# Patient Record
Sex: FEMALE | Race: WHITE | NOT HISPANIC OR LATINO | URBAN - METROPOLITAN AREA
[De-identification: names, ages, dates, MRNs, and addresses within clinical notes are randomized per-mention and may not be internally consistent; named-entity substitution may affect disease eponyms.]

---

## 2021-08-10 ENCOUNTER — INPATIENT (INPATIENT)
Facility: HOSPITAL | Age: 87
LOS: 6 days | Discharge: HOME | DRG: 690 | End: 2021-08-17
Attending: FAMILY MEDICINE | Admitting: HOSPITALIST
Payer: MEDICARE

## 2021-08-10 VITALS — HEIGHT: 60 IN | WEIGHT: 104.94 LBS

## 2021-08-10 DIAGNOSIS — R53.1 WEAKNESS: ICD-10-CM

## 2021-08-10 LAB
ALBUMIN SERPL ELPH-MCNC: 3.6 G/DL — SIGNIFICANT CHANGE UP (ref 3.3–5)
ALP SERPL-CCNC: 67 U/L — SIGNIFICANT CHANGE UP (ref 40–120)
ALT FLD-CCNC: 23 U/L — SIGNIFICANT CHANGE UP (ref 12–78)
ANION GAP SERPL CALC-SCNC: 4 MMOL/L — LOW (ref 5–17)
AST SERPL-CCNC: 25 U/L — SIGNIFICANT CHANGE UP (ref 15–37)
BASOPHILS # BLD AUTO: 0.07 K/UL — SIGNIFICANT CHANGE UP (ref 0–0.2)
BASOPHILS NFR BLD AUTO: 0.4 % — SIGNIFICANT CHANGE UP (ref 0–2)
BILIRUB SERPL-MCNC: 0.4 MG/DL — SIGNIFICANT CHANGE UP (ref 0.2–1.2)
BUN SERPL-MCNC: 24 MG/DL — HIGH (ref 7–23)
CALCIUM SERPL-MCNC: 9.5 MG/DL — SIGNIFICANT CHANGE UP (ref 8.5–10.1)
CHLORIDE SERPL-SCNC: 102 MMOL/L — SIGNIFICANT CHANGE UP (ref 96–108)
CO2 SERPL-SCNC: 29 MMOL/L — SIGNIFICANT CHANGE UP (ref 22–31)
CREAT SERPL-MCNC: 0.65 MG/DL — SIGNIFICANT CHANGE UP (ref 0.5–1.3)
EOSINOPHIL # BLD AUTO: 0.09 K/UL — SIGNIFICANT CHANGE UP (ref 0–0.5)
EOSINOPHIL NFR BLD AUTO: 0.5 % — SIGNIFICANT CHANGE UP (ref 0–6)
GLUCOSE SERPL-MCNC: 277 MG/DL — HIGH (ref 70–99)
HCT VFR BLD CALC: 32.5 % — LOW (ref 34.5–45)
HGB BLD-MCNC: 10.8 G/DL — LOW (ref 11.5–15.5)
IMM GRANULOCYTES NFR BLD AUTO: 0.6 % — SIGNIFICANT CHANGE UP (ref 0–1.5)
LYMPHOCYTES # BLD AUTO: 1.48 K/UL — SIGNIFICANT CHANGE UP (ref 1–3.3)
LYMPHOCYTES # BLD AUTO: 8.5 % — LOW (ref 13–44)
MAGNESIUM SERPL-MCNC: 2.3 MG/DL — SIGNIFICANT CHANGE UP (ref 1.6–2.6)
MCHC RBC-ENTMCNC: 29.4 PG — SIGNIFICANT CHANGE UP (ref 27–34)
MCHC RBC-ENTMCNC: 33.2 GM/DL — SIGNIFICANT CHANGE UP (ref 32–36)
MCV RBC AUTO: 88.6 FL — SIGNIFICANT CHANGE UP (ref 80–100)
MONOCYTES # BLD AUTO: 1.36 K/UL — HIGH (ref 0–0.9)
MONOCYTES NFR BLD AUTO: 7.8 % — SIGNIFICANT CHANGE UP (ref 2–14)
NEUTROPHILS # BLD AUTO: 14.37 K/UL — HIGH (ref 1.8–7.4)
NEUTROPHILS NFR BLD AUTO: 82.2 % — HIGH (ref 43–77)
PHOSPHATE SERPL-MCNC: 1.9 MG/DL — LOW (ref 2.5–4.5)
PLATELET # BLD AUTO: 303 K/UL — SIGNIFICANT CHANGE UP (ref 150–400)
POTASSIUM SERPL-MCNC: 4 MMOL/L — SIGNIFICANT CHANGE UP (ref 3.5–5.3)
POTASSIUM SERPL-SCNC: 4 MMOL/L — SIGNIFICANT CHANGE UP (ref 3.5–5.3)
PROT SERPL-MCNC: 7.7 GM/DL — SIGNIFICANT CHANGE UP (ref 6–8.3)
RBC # BLD: 3.67 M/UL — LOW (ref 3.8–5.2)
RBC # FLD: 13.2 % — SIGNIFICANT CHANGE UP (ref 10.3–14.5)
SARS-COV-2 RNA SPEC QL NAA+PROBE: SIGNIFICANT CHANGE UP
SODIUM SERPL-SCNC: 135 MMOL/L — SIGNIFICANT CHANGE UP (ref 135–145)
WBC # BLD: 17.47 K/UL — HIGH (ref 3.8–10.5)
WBC # FLD AUTO: 17.47 K/UL — HIGH (ref 3.8–10.5)

## 2021-08-10 PROCEDURE — 70450 CT HEAD/BRAIN W/O DYE: CPT | Mod: 26,MA

## 2021-08-10 PROCEDURE — 99285 EMERGENCY DEPT VISIT HI MDM: CPT

## 2021-08-10 PROCEDURE — 85027 COMPLETE CBC AUTOMATED: CPT

## 2021-08-10 PROCEDURE — 97530 THERAPEUTIC ACTIVITIES: CPT | Mod: GP

## 2021-08-10 PROCEDURE — 85025 COMPLETE CBC W/AUTO DIFF WBC: CPT

## 2021-08-10 PROCEDURE — 82962 GLUCOSE BLOOD TEST: CPT

## 2021-08-10 PROCEDURE — 36415 COLL VENOUS BLD VENIPUNCTURE: CPT

## 2021-08-10 PROCEDURE — 97161 PT EVAL LOW COMPLEX 20 MIN: CPT | Mod: GP

## 2021-08-10 PROCEDURE — 80048 BASIC METABOLIC PNL TOTAL CA: CPT

## 2021-08-10 PROCEDURE — 99222 1ST HOSP IP/OBS MODERATE 55: CPT

## 2021-08-10 PROCEDURE — 71045 X-RAY EXAM CHEST 1 VIEW: CPT | Mod: 26

## 2021-08-10 PROCEDURE — 87040 BLOOD CULTURE FOR BACTERIA: CPT

## 2021-08-10 PROCEDURE — 93010 ELECTROCARDIOGRAM REPORT: CPT

## 2021-08-10 PROCEDURE — 83036 HEMOGLOBIN GLYCOSYLATED A1C: CPT

## 2021-08-10 PROCEDURE — 86769 SARS-COV-2 COVID-19 ANTIBODY: CPT

## 2021-08-10 PROCEDURE — 87086 URINE CULTURE/COLONY COUNT: CPT

## 2021-08-10 PROCEDURE — 80053 COMPREHEN METABOLIC PANEL: CPT

## 2021-08-10 PROCEDURE — 85610 PROTHROMBIN TIME: CPT

## 2021-08-10 PROCEDURE — 97116 GAIT TRAINING THERAPY: CPT | Mod: GP

## 2021-08-10 RX ORDER — SODIUM CHLORIDE 9 MG/ML
500 INJECTION INTRAMUSCULAR; INTRAVENOUS; SUBCUTANEOUS ONCE
Refills: 0 | Status: COMPLETED | OUTPATIENT
Start: 2021-08-10 | End: 2021-08-10

## 2021-08-10 RX ORDER — METFORMIN HYDROCHLORIDE 850 MG/1
1 TABLET ORAL
Qty: 0 | Refills: 0 | DISCHARGE

## 2021-08-10 RX ORDER — CEFTRIAXONE 500 MG/1
1000 INJECTION, POWDER, FOR SOLUTION INTRAMUSCULAR; INTRAVENOUS EVERY 24 HOURS
Refills: 0 | Status: DISCONTINUED | OUTPATIENT
Start: 2021-08-11 | End: 2021-08-13

## 2021-08-10 RX ORDER — DEXTROSE 50 % IN WATER 50 %
25 SYRINGE (ML) INTRAVENOUS ONCE
Refills: 0 | Status: DISCONTINUED | OUTPATIENT
Start: 2021-08-10 | End: 2021-08-17

## 2021-08-10 RX ORDER — CEFTRIAXONE 500 MG/1
1000 INJECTION, POWDER, FOR SOLUTION INTRAMUSCULAR; INTRAVENOUS EVERY 24 HOURS
Refills: 0 | Status: DISCONTINUED | OUTPATIENT
Start: 2021-08-10 | End: 2021-08-10

## 2021-08-10 RX ORDER — LOVASTATIN 20 MG
1 TABLET ORAL
Qty: 0 | Refills: 0 | DISCHARGE

## 2021-08-10 RX ORDER — HEPARIN SODIUM 5000 [USP'U]/ML
5000 INJECTION INTRAVENOUS; SUBCUTANEOUS EVERY 12 HOURS
Refills: 0 | Status: DISCONTINUED | OUTPATIENT
Start: 2021-08-10 | End: 2021-08-12

## 2021-08-10 RX ORDER — ASPIRIN/CALCIUM CARB/MAGNESIUM 324 MG
325 TABLET ORAL DAILY
Refills: 0 | Status: DISCONTINUED | OUTPATIENT
Start: 2021-08-10 | End: 2021-08-14

## 2021-08-10 RX ORDER — GLUCAGON INJECTION, SOLUTION 0.5 MG/.1ML
1 INJECTION, SOLUTION SUBCUTANEOUS ONCE
Refills: 0 | Status: DISCONTINUED | OUTPATIENT
Start: 2021-08-10 | End: 2021-08-17

## 2021-08-10 RX ORDER — AMLODIPINE BESYLATE 2.5 MG/1
1 TABLET ORAL
Qty: 0 | Refills: 0 | DISCHARGE

## 2021-08-10 RX ORDER — LEVOTHYROXINE SODIUM 125 MCG
1 TABLET ORAL
Qty: 0 | Refills: 0 | DISCHARGE

## 2021-08-10 RX ORDER — METOPROLOL TARTRATE 50 MG
1 TABLET ORAL
Qty: 0 | Refills: 0 | DISCHARGE

## 2021-08-10 RX ORDER — ONDANSETRON 8 MG/1
4 TABLET, FILM COATED ORAL EVERY 8 HOURS
Refills: 0 | Status: DISCONTINUED | OUTPATIENT
Start: 2021-08-10 | End: 2021-08-17

## 2021-08-10 RX ORDER — METOPROLOL TARTRATE 50 MG
50 TABLET ORAL DAILY
Refills: 0 | Status: DISCONTINUED | OUTPATIENT
Start: 2021-08-10 | End: 2021-08-17

## 2021-08-10 RX ORDER — DEXTROSE 50 % IN WATER 50 %
12.5 SYRINGE (ML) INTRAVENOUS ONCE
Refills: 0 | Status: DISCONTINUED | OUTPATIENT
Start: 2021-08-10 | End: 2021-08-17

## 2021-08-10 RX ORDER — SODIUM CHLORIDE 9 MG/ML
1000 INJECTION, SOLUTION INTRAVENOUS
Refills: 0 | Status: DISCONTINUED | OUTPATIENT
Start: 2021-08-10 | End: 2021-08-17

## 2021-08-10 RX ORDER — ACETAMINOPHEN 500 MG
650 TABLET ORAL EVERY 6 HOURS
Refills: 0 | Status: DISCONTINUED | OUTPATIENT
Start: 2021-08-10 | End: 2021-08-17

## 2021-08-10 RX ORDER — DEXTROSE 50 % IN WATER 50 %
15 SYRINGE (ML) INTRAVENOUS ONCE
Refills: 0 | Status: DISCONTINUED | OUTPATIENT
Start: 2021-08-10 | End: 2021-08-17

## 2021-08-10 RX ORDER — CHOLECALCIFEROL (VITAMIN D3) 125 MCG
1000 CAPSULE ORAL DAILY
Refills: 0 | Status: DISCONTINUED | OUTPATIENT
Start: 2021-08-10 | End: 2021-08-17

## 2021-08-10 RX ORDER — CHOLECALCIFEROL (VITAMIN D3) 125 MCG
1 CAPSULE ORAL
Qty: 0 | Refills: 0 | DISCHARGE

## 2021-08-10 RX ORDER — ATORVASTATIN CALCIUM 80 MG/1
10 TABLET, FILM COATED ORAL AT BEDTIME
Refills: 0 | Status: DISCONTINUED | OUTPATIENT
Start: 2021-08-10 | End: 2021-08-17

## 2021-08-10 RX ORDER — AMLODIPINE BESYLATE 2.5 MG/1
10 TABLET ORAL DAILY
Refills: 0 | Status: DISCONTINUED | OUTPATIENT
Start: 2021-08-10 | End: 2021-08-17

## 2021-08-10 RX ORDER — LEVOTHYROXINE SODIUM 125 MCG
25 TABLET ORAL DAILY
Refills: 0 | Status: DISCONTINUED | OUTPATIENT
Start: 2021-08-10 | End: 2021-08-17

## 2021-08-10 RX ORDER — INSULIN LISPRO 100/ML
VIAL (ML) SUBCUTANEOUS
Refills: 0 | Status: DISCONTINUED | OUTPATIENT
Start: 2021-08-10 | End: 2021-08-17

## 2021-08-10 RX ORDER — LANOLIN ALCOHOL/MO/W.PET/CERES
3 CREAM (GRAM) TOPICAL AT BEDTIME
Refills: 0 | Status: DISCONTINUED | OUTPATIENT
Start: 2021-08-10 | End: 2021-08-17

## 2021-08-10 RX ADMIN — SODIUM CHLORIDE 1000 MILLILITER(S): 9 INJECTION INTRAMUSCULAR; INTRAVENOUS; SUBCUTANEOUS at 15:38

## 2021-08-10 NOTE — ED PROVIDER NOTE - ENMT, MLM
Airway patent, Nasal mucosa clear. Mouth with normal mucosa. Throat has no vesicles, no oropharyngeal exudates and uvula is midline. Ears: Pt is hard of hearing

## 2021-08-10 NOTE — H&P ADULT - HISTORY OF PRESENT ILLNESS
89 year old female patient who lives alone and is fully independent presented to the ED with her son who reported that she had sudden onset generalized weakness after a fall while on the toilet. Patient ambulates without the need for assistive devices but was unable to get up due to generalized upper extremity weakness. Patient and son denied any head trauma, loc, neck pain, upper or lower extremity pain.        In the ED patient found to have a Ua consistent with a UTI. CT head noted for No acute intracranial hemorrhage, vasogenic edema, extra-axial collection or midline shift. Mild chronic microvascular changes and age-related involutional change

## 2021-08-10 NOTE — ED STATDOCS - NS_ ATTENDINGSCRIBEDETAILS _ED_A_ED_FT
I, Jennifer Greer MD, performed the initial face to face bedside interview with this patient regarding history of present illness and determined that the patient should be seen in the main ED.  The history, was documented by the scribe in my presence and I attest to the accuracy of the documentation.

## 2021-08-10 NOTE — H&P ADULT - NSHPPHYSICALEXAM_GEN_ALL_CORE
Vital Signs Last 24 Hrs  T(C): 36.6 (10 Aug 2021 13:41), Max: 36.6 (10 Aug 2021 13:41)  T(F): 97.9 (10 Aug 2021 13:41), Max: 97.9 (10 Aug 2021 13:41)  HR: 84 (10 Aug 2021 13:41) (84 - 84)  BP: 137/61 (10 Aug 2021 13:41) (137/61 - 137/61)  BP(mean): 84 (10 Aug 2021 13:41) (84 - 84)  RR: 18 (10 Aug 2021 13:41) (18 - 18)  SpO2: 100% (10 Aug 2021 13:41) (100% - 100%)

## 2021-08-10 NOTE — ED PROVIDER NOTE - OBJECTIVE STATEMENT
89 y/o F with a PMHx of HTN, DM, thyroid disease and HLD presents to the ED c/o sudden onset of N/V and weakness yesterday while on the toilet. Pt fell off the toilet and crawled on floor and called her son. The son came and picked up the pt and fed the pt. The pt reports her symptoms improved but she went back to the bathroom later and fell again. Denies CP, HA, SOB. No LOC. The son came to bring the pt to the couch to allow the pt to sleep. Son states that the pt called him today saying that he fell against. No head injury, No LOC. Son helped pt up against and brought her in. Pt lives at home alone.

## 2021-08-10 NOTE — ED PROVIDER NOTE - EYES, MLM
Clear bilaterally, pupils equal, round and reactive to light. L lid droop, which son states is the pt's baseline

## 2021-08-10 NOTE — ED STATDOCS - PROGRESS NOTE DETAILS
Yaquelin Greer: 89 y/o female presents to the ED c/o sudden onset of increase weakness starting yesterday. Son states pt fell off the toilet yesterday and was unable to get up on her own. Pt was on the floor for 15 minutes until son came to help her. No hx of episode before. Pt states she vomited yesterday. Pt denies pain, CP, SOB. Will send pt to main ED for further evaluation.

## 2021-08-10 NOTE — ED PROVIDER NOTE - CLINICAL SUMMARY MEDICAL DECISION MAKING FREE TEXT BOX
pt with hx of HTN, DM here with generalized weakness and 3 falls without any injury. Plan: CT, head, UA, CXR, labs.

## 2021-08-10 NOTE — H&P ADULT - ASSESSMENT
89 year old female patient with weakness and debility found to have a UTI        -Admit to [facility]        # Weakness/ Debility  -UTI likely contributing  -will get PT evaluation  -may need [facility] or home PT    # UTI  -ua consistent with UTI  -on rocephin  -f/u urine cx    #DM2  -hold oral hypoglycemics  -ISS    # HLD  -on statin    # Advanced Directives  -Full code    # Other  -son Michael Chau can be reached at [phone]  # DVT ppx  -heparin sq

## 2021-08-10 NOTE — ED PROVIDER NOTE - CARE PLAN
Principal Discharge DX:	General weakness   1 Principal Discharge DX:	General weakness  Secondary Diagnosis:	Acute UTI

## 2021-08-10 NOTE — ED PROVIDER NOTE - NSICDXPASTMEDICALHX_GEN_ALL_CORE_FT
PAST MEDICAL HISTORY:  DM (diabetes mellitus)     HLD (hyperlipidemia)     HTN (hypertension)     Thyroid disease

## 2021-08-10 NOTE — ED ADULT TRIAGE NOTE - CHIEF COMPLAINT QUOTE
Pt c/o weakness that started yesterday.  Her provider recommended that she be seen in the ED.  Pt was unable to get up from toilet d/t weakness and lowered herself to the ground and had to crawl to living room to get help.  Denies trauma or head injury.  + anticoagulants

## 2021-08-11 LAB
A1C WITH ESTIMATED AVERAGE GLUCOSE RESULT: 7.3 % — HIGH (ref 4–5.6)
ALBUMIN SERPL ELPH-MCNC: 3.1 G/DL — LOW (ref 3.3–5)
ALP SERPL-CCNC: 64 U/L — SIGNIFICANT CHANGE UP (ref 40–120)
ALT FLD-CCNC: 20 U/L — SIGNIFICANT CHANGE UP (ref 12–78)
ANION GAP SERPL CALC-SCNC: 4 MMOL/L — LOW (ref 5–17)
AST SERPL-CCNC: 21 U/L — SIGNIFICANT CHANGE UP (ref 15–37)
BASOPHILS # BLD AUTO: 0.08 K/UL — SIGNIFICANT CHANGE UP (ref 0–0.2)
BASOPHILS NFR BLD AUTO: 0.6 % — SIGNIFICANT CHANGE UP (ref 0–2)
BILIRUB SERPL-MCNC: 0.3 MG/DL — SIGNIFICANT CHANGE UP (ref 0.2–1.2)
BUN SERPL-MCNC: 18 MG/DL — SIGNIFICANT CHANGE UP (ref 7–23)
CALCIUM SERPL-MCNC: 8.7 MG/DL — SIGNIFICANT CHANGE UP (ref 8.5–10.1)
CHLORIDE SERPL-SCNC: 106 MMOL/L — SIGNIFICANT CHANGE UP (ref 96–108)
CO2 SERPL-SCNC: 29 MMOL/L — SIGNIFICANT CHANGE UP (ref 22–31)
COVID-19 SPIKE DOMAIN AB INTERP: POSITIVE
COVID-19 SPIKE DOMAIN ANTIBODY RESULT: >250 U/ML — HIGH
CREAT SERPL-MCNC: 0.38 MG/DL — LOW (ref 0.5–1.3)
EOSINOPHIL # BLD AUTO: 0.42 K/UL — SIGNIFICANT CHANGE UP (ref 0–0.5)
EOSINOPHIL NFR BLD AUTO: 3.3 % — SIGNIFICANT CHANGE UP (ref 0–6)
ESTIMATED AVERAGE GLUCOSE: 163 MG/DL — HIGH (ref 68–114)
GLUCOSE SERPL-MCNC: 155 MG/DL — HIGH (ref 70–99)
HCT VFR BLD CALC: 33 % — LOW (ref 34.5–45)
HGB BLD-MCNC: 10.8 G/DL — LOW (ref 11.5–15.5)
IMM GRANULOCYTES NFR BLD AUTO: 0.5 % — SIGNIFICANT CHANGE UP (ref 0–1.5)
INR BLD: 1.1 RATIO — SIGNIFICANT CHANGE UP (ref 0.88–1.16)
LYMPHOCYTES # BLD AUTO: 1.95 K/UL — SIGNIFICANT CHANGE UP (ref 1–3.3)
LYMPHOCYTES # BLD AUTO: 15.4 % — SIGNIFICANT CHANGE UP (ref 13–44)
MCHC RBC-ENTMCNC: 29.2 PG — SIGNIFICANT CHANGE UP (ref 27–34)
MCHC RBC-ENTMCNC: 32.7 GM/DL — SIGNIFICANT CHANGE UP (ref 32–36)
MCV RBC AUTO: 89.2 FL — SIGNIFICANT CHANGE UP (ref 80–100)
MONOCYTES # BLD AUTO: 1.04 K/UL — HIGH (ref 0–0.9)
MONOCYTES NFR BLD AUTO: 8.2 % — SIGNIFICANT CHANGE UP (ref 2–14)
NEUTROPHILS # BLD AUTO: 9.13 K/UL — HIGH (ref 1.8–7.4)
NEUTROPHILS NFR BLD AUTO: 72 % — SIGNIFICANT CHANGE UP (ref 43–77)
PLATELET # BLD AUTO: 314 K/UL — SIGNIFICANT CHANGE UP (ref 150–400)
POTASSIUM SERPL-MCNC: 3.4 MMOL/L — LOW (ref 3.5–5.3)
POTASSIUM SERPL-SCNC: 3.4 MMOL/L — LOW (ref 3.5–5.3)
PROT SERPL-MCNC: 6.9 GM/DL — SIGNIFICANT CHANGE UP (ref 6–8.3)
PROTHROM AB SERPL-ACNC: 12.7 SEC — SIGNIFICANT CHANGE UP (ref 10.6–13.6)
RBC # BLD: 3.7 M/UL — LOW (ref 3.8–5.2)
RBC # FLD: 13.2 % — SIGNIFICANT CHANGE UP (ref 10.3–14.5)
SARS-COV-2 IGG+IGM SERPL QL IA: >250 U/ML — HIGH
SARS-COV-2 IGG+IGM SERPL QL IA: POSITIVE
SODIUM SERPL-SCNC: 139 MMOL/L — SIGNIFICANT CHANGE UP (ref 135–145)
WBC # BLD: 12.68 K/UL — HIGH (ref 3.8–10.5)
WBC # FLD AUTO: 12.68 K/UL — HIGH (ref 3.8–10.5)

## 2021-08-11 PROCEDURE — 99232 SBSQ HOSP IP/OBS MODERATE 35: CPT

## 2021-08-11 RX ADMIN — Medication 50 MILLIGRAM(S): at 10:13

## 2021-08-11 RX ADMIN — Medication 25 MICROGRAM(S): at 06:18

## 2021-08-11 RX ADMIN — Medication 1 TABLET(S): at 10:13

## 2021-08-11 RX ADMIN — HEPARIN SODIUM 5000 UNIT(S): 5000 INJECTION INTRAVENOUS; SUBCUTANEOUS at 10:13

## 2021-08-11 RX ADMIN — Medication 1000 UNIT(S): at 10:13

## 2021-08-11 RX ADMIN — AMLODIPINE BESYLATE 10 MILLIGRAM(S): 2.5 TABLET ORAL at 10:13

## 2021-08-11 RX ADMIN — ATORVASTATIN CALCIUM 10 MILLIGRAM(S): 80 TABLET, FILM COATED ORAL at 21:43

## 2021-08-11 RX ADMIN — ATORVASTATIN CALCIUM 10 MILLIGRAM(S): 80 TABLET, FILM COATED ORAL at 00:05

## 2021-08-11 RX ADMIN — CEFTRIAXONE 1000 MILLIGRAM(S): 500 INJECTION, POWDER, FOR SOLUTION INTRAMUSCULAR; INTRAVENOUS at 01:41

## 2021-08-11 RX ADMIN — HEPARIN SODIUM 5000 UNIT(S): 5000 INJECTION INTRAVENOUS; SUBCUTANEOUS at 21:43

## 2021-08-11 RX ADMIN — Medication 1: at 18:01

## 2021-08-11 RX ADMIN — Medication 5: at 12:04

## 2021-08-11 RX ADMIN — Medication 325 MILLIGRAM(S): at 11:19

## 2021-08-11 NOTE — PHYSICAL THERAPY INITIAL EVALUATION ADULT - PERTINENT HX OF CURRENT PROBLEM, REHAB EVAL
presented to the ED with her son who reported that she had sudden onset generalized weakness after a fall while on the toilet, UA c/w UTI, CTH neg ac findings

## 2021-08-11 NOTE — PHYSICAL THERAPY INITIAL EVALUATION ADULT - GENERAL OBSERVATIONS, REHAB EVAL
pt rec'd supine in bed on 5E, agreeable to PT, son present, pt [place]. L eyelid droops-chr. per pt

## 2021-08-12 LAB
ANION GAP SERPL CALC-SCNC: 4 MMOL/L — LOW (ref 5–17)
BUN SERPL-MCNC: 16 MG/DL — SIGNIFICANT CHANGE UP (ref 7–23)
CALCIUM SERPL-MCNC: 8.7 MG/DL — SIGNIFICANT CHANGE UP (ref 8.5–10.1)
CHLORIDE SERPL-SCNC: 107 MMOL/L — SIGNIFICANT CHANGE UP (ref 96–108)
CO2 SERPL-SCNC: 29 MMOL/L — SIGNIFICANT CHANGE UP (ref 22–31)
CREAT SERPL-MCNC: 0.42 MG/DL — LOW (ref 0.5–1.3)
CULTURE RESULTS: SIGNIFICANT CHANGE UP
GLUCOSE SERPL-MCNC: 128 MG/DL — HIGH (ref 70–99)
HCT VFR BLD CALC: 31.8 % — LOW (ref 34.5–45)
HGB BLD-MCNC: 10.2 G/DL — LOW (ref 11.5–15.5)
MCHC RBC-ENTMCNC: 28.9 PG — SIGNIFICANT CHANGE UP (ref 27–34)
MCHC RBC-ENTMCNC: 32.1 GM/DL — SIGNIFICANT CHANGE UP (ref 32–36)
MCV RBC AUTO: 90.1 FL — SIGNIFICANT CHANGE UP (ref 80–100)
PLATELET # BLD AUTO: 288 K/UL — SIGNIFICANT CHANGE UP (ref 150–400)
POTASSIUM SERPL-MCNC: 3.4 MMOL/L — LOW (ref 3.5–5.3)
POTASSIUM SERPL-SCNC: 3.4 MMOL/L — LOW (ref 3.5–5.3)
RBC # BLD: 3.53 M/UL — LOW (ref 3.8–5.2)
RBC # FLD: 13.2 % — SIGNIFICANT CHANGE UP (ref 10.3–14.5)
SODIUM SERPL-SCNC: 140 MMOL/L — SIGNIFICANT CHANGE UP (ref 135–145)
SPECIMEN SOURCE: SIGNIFICANT CHANGE UP
WBC # BLD: 10.62 K/UL — HIGH (ref 3.8–10.5)
WBC # FLD AUTO: 10.62 K/UL — HIGH (ref 3.8–10.5)

## 2021-08-12 PROCEDURE — 99232 SBSQ HOSP IP/OBS MODERATE 35: CPT

## 2021-08-12 RX ORDER — POTASSIUM CHLORIDE 20 MEQ
40 PACKET (EA) ORAL ONCE
Refills: 0 | Status: COMPLETED | OUTPATIENT
Start: 2021-08-12 | End: 2021-08-12

## 2021-08-12 RX ADMIN — AMLODIPINE BESYLATE 10 MILLIGRAM(S): 2.5 TABLET ORAL at 10:53

## 2021-08-12 RX ADMIN — Medication 1: at 12:32

## 2021-08-12 RX ADMIN — Medication 1000 UNIT(S): at 10:54

## 2021-08-12 RX ADMIN — Medication 40 MILLIEQUIVALENT(S): at 10:53

## 2021-08-12 RX ADMIN — ATORVASTATIN CALCIUM 10 MILLIGRAM(S): 80 TABLET, FILM COATED ORAL at 21:44

## 2021-08-12 RX ADMIN — Medication 50 MILLIGRAM(S): at 10:53

## 2021-08-12 RX ADMIN — Medication 25 MICROGRAM(S): at 05:16

## 2021-08-12 RX ADMIN — HEPARIN SODIUM 5000 UNIT(S): 5000 INJECTION INTRAVENOUS; SUBCUTANEOUS at 10:54

## 2021-08-12 RX ADMIN — CEFTRIAXONE 1000 MILLIGRAM(S): 500 INJECTION, POWDER, FOR SOLUTION INTRAMUSCULAR; INTRAVENOUS at 00:33

## 2021-08-12 RX ADMIN — Medication 1 TABLET(S): at 10:54

## 2021-08-12 RX ADMIN — Medication 1: at 08:29

## 2021-08-12 RX ADMIN — Medication 325 MILLIGRAM(S): at 10:53

## 2021-08-12 NOTE — CONSULT NOTE ADULT - SUBJECTIVE AND OBJECTIVE BOX
89 yo F with a hx of hemorrhoids presents with bright red blood per rectum x 2 days. Patient states that she tried to use the bathroom and bloody came out of her rectum. She states today the blood was darker. She has admits to a hx of constipation but none now. She is passing gas. Her last colonoscopy which was more than 20 years ago she states shows the hemorrhoids but no other pathologies. She denies abdominal pain, diarrhea, nausea, vomiting, fever or chills    ROS neg except as above     PMH: DM, HLD. HTN, Thyroid disease.   Allergies: None  Meds: as per chart  PSH: Denies  Sohx: no tobacco, etoh, or illicit drugs    Vitals:  T(C): 36.4 (08-12 @ 08:00), Max: 37.1 (08-12 @ 00:13)  HR: 92 (08-12 @ 08:00) (80 - 92)  BP: 134/60 (08-12 @ 08:00) (113/53 - 134/60)  RR: 19 (08-12 @ 08:00) (18 - 19)  SpO2: 94% (08-12 @ 08:00) (92% - 97%)    08-11 @ 07:01  -  08-12 @ 07:00  --------------------------------------------------------  IN:  Total IN: 0 mL    OUT:    Voided (mL): 2 mL  Total OUT: 2 mL    Total NET: -2 mL      Physical Exam:  General: AAOx3, Well developed, NAD  Chest: Normal respiratory effort  Heart: RRR  Abdomen: Soft, distended, non tender  Rectal: 3 prolapsed hemorrhoids noted, blood noted, liquid stool, no masses appreciated  Neuro/Psych: No localized deficits. Normal speech, normal tone  Skin: Normal, no rashes, no lesions noted.   Extremities: Warm, well perfused, no edema, Pulses intact    08-12 @ 07:10                    10.2  CBC: 10.62>)-------(<288                     31.8                 140 | 107 | 16    CMP:  ----------------------< 128               3.4 | 29 | 0.42                      Ca:8.7  Phos:-  Mg:-               -|      |-        LFTs:  ------|-|-----             -|      |-  08-11 @ 06:50                    10.8  CBC: 12.68>)-------(<314                     33.0                 139 | 106 | 18    CMP:  ----------------------< 155               3.4 | 29 | 0.38                      Ca:8.7  Phos:-  Mg:-               0.3|      |21        LFTs:  ------|64|-----             -|      |-      Culture - Blood (collected 08-10-21 @ 21:16)  Source: .Blood None  Preliminary Report (08-12-21 @ 05:01):    No growth to date.    Culture - Blood (collected 08-10-21 @ 21:15)  Source: .Blood None  Preliminary Report (08-12-21 @ 05:01):    No growth to date.      Current Inpatient Medications:  acetaminophen   Tablet .. 650 milliGRAM(s) Oral every 6 hours PRN  aluminum hydroxide/magnesium hydroxide/simethicone Suspension 30 milliLiter(s) Oral every 4 hours PRN  amLODIPine   Tablet 10 milliGRAM(s) Oral daily  aspirin enteric coated 325 milliGRAM(s) Oral daily  atorvastatin 10 milliGRAM(s) Oral at bedtime  cefTRIAXone Injectable. 1000 milliGRAM(s) IV Push every 24 hours  cholecalciferol 1000 Unit(s) Oral daily  dextrose 40% Gel 15 Gram(s) Oral once  dextrose 5%. 1000 milliLiter(s) (50 mL/Hr) IV Continuous <Continuous>  dextrose 5%. 1000 milliLiter(s) (100 mL/Hr) IV Continuous <Continuous>  dextrose 50% Injectable 25 Gram(s) IV Push once  dextrose 50% Injectable 12.5 Gram(s) IV Push once  dextrose 50% Injectable 25 Gram(s) IV Push once  glucagon  Injectable 1 milliGRAM(s) IntraMuscular once  heparin   Injectable 5000 Unit(s) SubCutaneous every 12 hours  insulin lispro (ADMELOG) corrective regimen sliding scale   SubCutaneous three times a day before meals  levothyroxine 25 MICROGram(s) Oral daily  melatonin 3 milliGRAM(s) Oral at bedtime PRN  metoprolol succinate ER 50 milliGRAM(s) Oral daily  multivitamin 1 Tablet(s) Oral daily  ondansetron Injectable 4 milliGRAM(s) IV Push every 8 hours PRN

## 2021-08-12 NOTE — CONSULT NOTE ADULT - ASSESSMENT
89 yo F presents with bleeding internal hemorrhoids, grade 4.    Plan:  No surgical intervention at this time due to elderly status. Conservative management at this time.   Recommend high fiber diet, oral hydration  Sitz baths  Monitor HH, transfuse PRN  Reconsult if needed. Thanks for the consult.  Rest of management as per primary team    Plan discussed with Dr. Adams.  89 yo F presents with external hemorrhoids, grade 4, not inflamed at this time. Bleeding likely from internal hemorrhoids    Plan:  No surgical intervention at this time due to elderly status. Conservative management at this time.   Recommend high fiber diet, oral hydration  Sitz baths  Monitor HH, transfuse PRN  Reconsult if needed. Thanks for the consult.  Rest of management as per primary team  Can have patient follow up as outpatient after discharge    Plan discussed with Dr. Adams.

## 2021-08-13 PROCEDURE — 99232 SBSQ HOSP IP/OBS MODERATE 35: CPT

## 2021-08-13 RX ORDER — SENNA PLUS 8.6 MG/1
1 TABLET ORAL DAILY
Refills: 0 | Status: DISCONTINUED | OUTPATIENT
Start: 2021-08-13 | End: 2021-08-17

## 2021-08-13 RX ORDER — POLYETHYLENE GLYCOL 3350 17 G/17G
17 POWDER, FOR SOLUTION ORAL DAILY
Refills: 0 | Status: DISCONTINUED | OUTPATIENT
Start: 2021-08-13 | End: 2021-08-17

## 2021-08-13 RX ADMIN — SENNA PLUS 1 TABLET(S): 8.6 TABLET ORAL at 10:36

## 2021-08-13 RX ADMIN — Medication 25 MICROGRAM(S): at 05:35

## 2021-08-13 RX ADMIN — Medication 1: at 08:24

## 2021-08-13 RX ADMIN — CEFTRIAXONE 1000 MILLIGRAM(S): 500 INJECTION, POWDER, FOR SOLUTION INTRAMUSCULAR; INTRAVENOUS at 01:23

## 2021-08-13 RX ADMIN — Medication 50 MILLIGRAM(S): at 09:27

## 2021-08-13 RX ADMIN — Medication 1 TABLET(S): at 09:27

## 2021-08-13 RX ADMIN — Medication 1000 UNIT(S): at 09:27

## 2021-08-13 RX ADMIN — Medication 2: at 11:57

## 2021-08-13 RX ADMIN — Medication 325 MILLIGRAM(S): at 09:28

## 2021-08-13 RX ADMIN — Medication 1: at 16:53

## 2021-08-13 RX ADMIN — POLYETHYLENE GLYCOL 3350 17 GRAM(S): 17 POWDER, FOR SOLUTION ORAL at 10:36

## 2021-08-13 RX ADMIN — AMLODIPINE BESYLATE 10 MILLIGRAM(S): 2.5 TABLET ORAL at 09:27

## 2021-08-13 NOTE — PHARMACOTHERAPY INTERVENTION NOTE - COMMENTS
Patient on IV to PO ASP List. Urine cx negative and pt reports no sx. Ceftriaxone D/C'd after 3 doses as it is adequate duration of therapy.

## 2021-08-14 LAB
ANION GAP SERPL CALC-SCNC: 3 MMOL/L — LOW (ref 5–17)
BUN SERPL-MCNC: 16 MG/DL — SIGNIFICANT CHANGE UP (ref 7–23)
CALCIUM SERPL-MCNC: 8.9 MG/DL — SIGNIFICANT CHANGE UP (ref 8.5–10.1)
CHLORIDE SERPL-SCNC: 103 MMOL/L — SIGNIFICANT CHANGE UP (ref 96–108)
CO2 SERPL-SCNC: 30 MMOL/L — SIGNIFICANT CHANGE UP (ref 22–31)
CREAT SERPL-MCNC: 0.59 MG/DL — SIGNIFICANT CHANGE UP (ref 0.5–1.3)
GLUCOSE SERPL-MCNC: 222 MG/DL — HIGH (ref 70–99)
HCT VFR BLD CALC: 31 % — LOW (ref 34.5–45)
HGB BLD-MCNC: 10.3 G/DL — LOW (ref 11.5–15.5)
MCHC RBC-ENTMCNC: 29.5 PG — SIGNIFICANT CHANGE UP (ref 27–34)
MCHC RBC-ENTMCNC: 33.2 GM/DL — SIGNIFICANT CHANGE UP (ref 32–36)
MCV RBC AUTO: 88.8 FL — SIGNIFICANT CHANGE UP (ref 80–100)
PLATELET # BLD AUTO: 340 K/UL — SIGNIFICANT CHANGE UP (ref 150–400)
POTASSIUM SERPL-MCNC: 4.2 MMOL/L — SIGNIFICANT CHANGE UP (ref 3.5–5.3)
POTASSIUM SERPL-SCNC: 4.2 MMOL/L — SIGNIFICANT CHANGE UP (ref 3.5–5.3)
RBC # BLD: 3.49 M/UL — LOW (ref 3.8–5.2)
RBC # FLD: 13.1 % — SIGNIFICANT CHANGE UP (ref 10.3–14.5)
SODIUM SERPL-SCNC: 136 MMOL/L — SIGNIFICANT CHANGE UP (ref 135–145)
WBC # BLD: 10.55 K/UL — HIGH (ref 3.8–10.5)
WBC # FLD AUTO: 10.55 K/UL — HIGH (ref 3.8–10.5)

## 2021-08-14 PROCEDURE — 99232 SBSQ HOSP IP/OBS MODERATE 35: CPT

## 2021-08-14 RX ADMIN — POLYETHYLENE GLYCOL 3350 17 GRAM(S): 17 POWDER, FOR SOLUTION ORAL at 10:37

## 2021-08-14 RX ADMIN — AMLODIPINE BESYLATE 10 MILLIGRAM(S): 2.5 TABLET ORAL at 10:34

## 2021-08-14 RX ADMIN — Medication 2: at 11:55

## 2021-08-14 RX ADMIN — Medication 50 MILLIGRAM(S): at 10:34

## 2021-08-14 RX ADMIN — Medication 3 MILLIGRAM(S): at 21:19

## 2021-08-14 RX ADMIN — Medication 1 TABLET(S): at 10:34

## 2021-08-14 RX ADMIN — Medication 25 MICROGRAM(S): at 05:52

## 2021-08-14 RX ADMIN — Medication 1000 UNIT(S): at 10:34

## 2021-08-14 RX ADMIN — SENNA PLUS 1 TABLET(S): 8.6 TABLET ORAL at 21:17

## 2021-08-14 RX ADMIN — ATORVASTATIN CALCIUM 10 MILLIGRAM(S): 80 TABLET, FILM COATED ORAL at 21:17

## 2021-08-14 RX ADMIN — Medication 2: at 17:38

## 2021-08-14 RX ADMIN — Medication 1: at 08:26

## 2021-08-14 RX ADMIN — ATORVASTATIN CALCIUM 10 MILLIGRAM(S): 80 TABLET, FILM COATED ORAL at 05:50

## 2021-08-15 PROCEDURE — 99232 SBSQ HOSP IP/OBS MODERATE 35: CPT

## 2021-08-15 RX ORDER — PHENYLEPHRINE-SHARK LIVER OIL-MINERAL OIL-PETROLATUM RECTAL OINTMENT
1 OINTMENT (GRAM) RECTAL
Refills: 0 | Status: DISCONTINUED | OUTPATIENT
Start: 2021-08-15 | End: 2021-08-17

## 2021-08-15 RX ADMIN — Medication 1: at 16:47

## 2021-08-15 RX ADMIN — SENNA PLUS 1 TABLET(S): 8.6 TABLET ORAL at 21:46

## 2021-08-15 RX ADMIN — Medication 1 TABLET(S): at 11:07

## 2021-08-15 RX ADMIN — Medication 1000 UNIT(S): at 11:07

## 2021-08-15 RX ADMIN — Medication 2: at 08:03

## 2021-08-15 RX ADMIN — ATORVASTATIN CALCIUM 10 MILLIGRAM(S): 80 TABLET, FILM COATED ORAL at 21:45

## 2021-08-15 RX ADMIN — Medication 50 MILLIGRAM(S): at 11:07

## 2021-08-15 RX ADMIN — AMLODIPINE BESYLATE 10 MILLIGRAM(S): 2.5 TABLET ORAL at 11:07

## 2021-08-15 RX ADMIN — Medication 3: at 12:08

## 2021-08-15 RX ADMIN — Medication 3 MILLIGRAM(S): at 21:45

## 2021-08-15 RX ADMIN — POLYETHYLENE GLYCOL 3350 17 GRAM(S): 17 POWDER, FOR SOLUTION ORAL at 11:06

## 2021-08-15 RX ADMIN — Medication 25 MICROGRAM(S): at 05:34

## 2021-08-15 RX ADMIN — PHENYLEPHRINE-SHARK LIVER OIL-MINERAL OIL-PETROLATUM RECTAL OINTMENT 1 APPLICATION(S): at 21:45

## 2021-08-16 LAB
CULTURE RESULTS: SIGNIFICANT CHANGE UP
CULTURE RESULTS: SIGNIFICANT CHANGE UP
SPECIMEN SOURCE: SIGNIFICANT CHANGE UP
SPECIMEN SOURCE: SIGNIFICANT CHANGE UP

## 2021-08-16 PROCEDURE — 99239 HOSP IP/OBS DSCHRG MGMT >30: CPT

## 2021-08-16 RX ORDER — ASPIRIN/CALCIUM CARB/MAGNESIUM 324 MG
1 TABLET ORAL
Qty: 0 | Refills: 0 | DISCHARGE

## 2021-08-16 RX ORDER — CX-024414 0.2 MG/ML
0.5 INJECTION, SUSPENSION INTRAMUSCULAR
Qty: 0 | Refills: 0 | DISCHARGE

## 2021-08-16 RX ORDER — SENNA PLUS 8.6 MG/1
1 TABLET ORAL
Qty: 0 | Refills: 0 | DISCHARGE
Start: 2021-08-16

## 2021-08-16 RX ADMIN — Medication 4: at 12:28

## 2021-08-16 RX ADMIN — Medication 3 MILLIGRAM(S): at 22:00

## 2021-08-16 RX ADMIN — Medication 2: at 16:36

## 2021-08-16 RX ADMIN — AMLODIPINE BESYLATE 10 MILLIGRAM(S): 2.5 TABLET ORAL at 09:20

## 2021-08-16 RX ADMIN — Medication 25 MICROGRAM(S): at 05:27

## 2021-08-16 RX ADMIN — PHENYLEPHRINE-SHARK LIVER OIL-MINERAL OIL-PETROLATUM RECTAL OINTMENT 1 APPLICATION(S): at 12:11

## 2021-08-16 RX ADMIN — Medication 50 MILLIGRAM(S): at 09:20

## 2021-08-16 RX ADMIN — Medication 650 MILLIGRAM(S): at 05:28

## 2021-08-16 RX ADMIN — Medication 1 TABLET(S): at 09:20

## 2021-08-16 RX ADMIN — POLYETHYLENE GLYCOL 3350 17 GRAM(S): 17 POWDER, FOR SOLUTION ORAL at 09:20

## 2021-08-16 RX ADMIN — ATORVASTATIN CALCIUM 10 MILLIGRAM(S): 80 TABLET, FILM COATED ORAL at 22:00

## 2021-08-16 RX ADMIN — PHENYLEPHRINE-SHARK LIVER OIL-MINERAL OIL-PETROLATUM RECTAL OINTMENT 1 APPLICATION(S): at 22:00

## 2021-08-16 RX ADMIN — Medication 1000 UNIT(S): at 09:20

## 2021-08-16 RX ADMIN — SENNA PLUS 1 TABLET(S): 8.6 TABLET ORAL at 22:00

## 2021-08-16 NOTE — DISCHARGE NOTE PROVIDER - NSDCMRMEDTOKEN_GEN_ALL_CORE_FT
amLODIPine 10 mg oral tablet: 1 tab(s) orally once a day  glipiZIDE 10 mg oral tablet: 1 tab(s) orally 2 times a day  levothyroxine 25 mcg (0.025 mg) oral tablet: 1 tab(s) orally once a day  lovastatin 10 mg oral tablet: 1 tab(s) orally once a day  metFORMIN 500 mg oral tablet: 1 tab(s) orally 2 times a day  metoprolol succinate 50 mg oral tablet, extended release: 1 tab(s) orally once a day  Multiple Vitamins oral tablet: 1 tab(s) orally once a day  senna oral tablet: 1 tab(s) orally once a day  Vitamin D3 25 mcg (1000 intl units) oral tablet: 1 tab(s) orally once a day   amLODIPine 10 mg oral tablet: 1 tab(s) orally once a day  levothyroxine 25 mcg (0.025 mg) oral tablet: 1 tab(s) orally once a day  lovastatin 10 mg oral tablet: 1 tab(s) orally once a day  metFORMIN 500 mg oral tablet: 1 tab(s) orally 2 times a day  metoprolol succinate 50 mg oral tablet, extended release: 1 tab(s) orally once a day  Multiple Vitamins oral tablet: 1 tab(s) orally once a day  senna oral tablet: 1 tab(s) orally once a day  Vitamin D3 25 mcg (1000 intl units) oral tablet: 1 tab(s) orally once a day

## 2021-08-16 NOTE — PROVIDER CONTACT NOTE (OTHER) - SITUATION
notified Dr Joshi's office of admission please fax over d.c paperwork to [phone] once pt is d.c
 is aware of consult.

## 2021-08-16 NOTE — DISCHARGE NOTE PROVIDER - NSDCCPCAREPLAN_GEN_ALL_CORE_FT
PRINCIPAL DISCHARGE DIAGNOSIS  Diagnosis: General weakness  Assessment and Plan of Treatment: PHysical therapy and rehab      SECONDARY DISCHARGE DIAGNOSES  Diagnosis: Acute UTI  Assessment and Plan of Treatment: RESOLVED    Diagnosis: Hemorrhoids  Assessment and Plan of Treatment: With mild bleeding at times - blood counts stable.  continue supportive care.  bowel regimen  sitz bath  fiber  prep H

## 2021-08-17 VITALS
DIASTOLIC BLOOD PRESSURE: 54 MMHG | TEMPERATURE: 97 F | OXYGEN SATURATION: 94 % | SYSTOLIC BLOOD PRESSURE: 124 MMHG | HEART RATE: 89 BPM | RESPIRATION RATE: 18 BRPM

## 2021-08-17 PROCEDURE — 99232 SBSQ HOSP IP/OBS MODERATE 35: CPT

## 2021-08-17 RX ADMIN — AMLODIPINE BESYLATE 10 MILLIGRAM(S): 2.5 TABLET ORAL at 08:27

## 2021-08-17 RX ADMIN — Medication 1 TABLET(S): at 08:28

## 2021-08-17 RX ADMIN — Medication 25 MICROGRAM(S): at 06:08

## 2021-08-17 RX ADMIN — PHENYLEPHRINE-SHARK LIVER OIL-MINERAL OIL-PETROLATUM RECTAL OINTMENT 1 APPLICATION(S): at 08:30

## 2021-08-17 RX ADMIN — Medication 2: at 08:18

## 2021-08-17 RX ADMIN — Medication 50 MILLIGRAM(S): at 08:28

## 2021-08-17 RX ADMIN — Medication 1000 UNIT(S): at 08:28

## 2021-08-17 RX ADMIN — Medication 3: at 12:00

## 2021-08-17 NOTE — PROGRESS NOTE ADULT - SUBJECTIVE AND OBJECTIVE BOX
CC: Weakness  History of Present Illness:   89 year old female patient who lives alone and is fully independent presented to the ED with her son who reported that she had sudden onset generalized weakness after a fall while on the toilet. Patient ambulates without the need for assistive devices but was unable to get up due to generalized upper extremity weakness. Patient and son denied any head trauma, loc, neck pain, upper or lower extremity pain.  In the ED patient found to have a Ua consistent with a UTI. CT head noted for No acute intracranial hemorrhage, vasogenic edema, extra-axial collection or midline shift. Mild chronic microvascular changes and age-related involutional change.    : Awake, lying in bed, feeling well.  Discussed plan of care.  Pt Denies fever, chills, N, V, abd pain, CP, SOB.    REVIEW OF SYSTEMS: All other review of systems is negative unless indicated above.    Vital Signs Last 24 Hrs  T(C): 36.8 (11 Aug 2021 08:45), Max: 37 (11 Aug 2021 02:34)  T(F): 98.3 (11 Aug 2021 08:45), Max: 98.6 (11 Aug 2021 02:34)  HR: 72 (11 Aug 2021 08:45) (72 - 100)  BP: 119/56 (11 Aug 2021 08:45) (119/56 - 137/61)  BP(mean): 88 (11 Aug 2021 07:51) (84 - 100)  RR: 18 (11 Aug 2021 08:45) (17 - 18)  SpO2: 97% (11 Aug 2021 08:45) (96% - 100%)    PHYSICAL EXAM:    Constitutional: NAD, awake and alert, frail, thin appearing  HEENT: PERR, EOMI, Normal Hearing, MMM  Neck: Soft and supple  Respiratory: Breath sounds are clear bilaterally, No wheezing, rales or rhonchi  Cardiovascular: S1 and S2, regular rate and rhythm, no Murmurs, gallops or rubs  Gastrointestinal: Bowel Sounds present, soft, nontender, nondistended, no guarding, no rebound  Extremities: No peripheral edema  Neurological: A/O x 3, no focal deficits in my limited exam            MEDICATIONS  (STANDING):  amLODIPine   Tablet 10 milliGRAM(s) Oral daily  aspirin enteric coated 325 milliGRAM(s) Oral daily  atorvastatin 10 milliGRAM(s) Oral at bedtime  cefTRIAXone Injectable. 1000 milliGRAM(s) IV Push every 24 hours  cholecalciferol 1000 Unit(s) Oral daily  dextrose 40% Gel 15 Gram(s) Oral once  dextrose 5%. 1000 milliLiter(s) (50 mL/Hr) IV Continuous <Continuous>  dextrose 5%. 1000 milliLiter(s) (100 mL/Hr) IV Continuous <Continuous>  dextrose 50% Injectable 25 Gram(s) IV Push once  dextrose 50% Injectable 12.5 Gram(s) IV Push once  dextrose 50% Injectable 25 Gram(s) IV Push once  glucagon  Injectable 1 milliGRAM(s) IntraMuscular once  heparin   Injectable 5000 Unit(s) SubCutaneous every 12 hours  insulin lispro (ADMELOG) corrective regimen sliding scale   SubCutaneous three times a day before meals  levothyroxine 25 MICROGram(s) Oral daily  metoprolol succinate ER 50 milliGRAM(s) Oral daily  multivitamin 1 Tablet(s) Oral daily    MEDICATIONS  (PRN):  acetaminophen   Tablet .. 650 milliGRAM(s) Oral every 6 hours PRN Temp greater or equal to 38.5C (101.3F), Mild Pain (1 - 3)  aluminum hydroxide/magnesium hydroxide/simethicone Suspension 30 milliLiter(s) Oral every 4 hours PRN Dyspepsia  melatonin 3 milliGRAM(s) Oral at bedtime PRN Insomnia  ondansetron Injectable 4 milliGRAM(s) IV Push every 8 hours PRN Nausea and/or Vomiting                                10.8   12.68 )-----------( 314      ( 11 Aug 2021 06:50 )             33.0     08-11    139  |  106  |  18  ----------------------------<  155<H>  3.4<L>   |  29  |  0.38<L>    Ca    8.7      11 Aug 2021 06:50  Phos  1.9     08-10  Mg     2.3     08-10    TPro  6.9  /  Alb  3.1<L>  /  TBili  0.3  /  DBili  x   /  AST  21  /  ALT  20  /  AlkPhos  64  08-11    CAPILLARY BLOOD GLUCOSE      POCT Blood Glucose.: 370 mg/dL (11 Aug 2021 11:50)  POCT Blood Glucose.: 382 mg/dL (11 Aug 2021 11:49)  POCT Blood Glucose.: 150 mg/dL (11 Aug 2021 07:28)    LIVER FUNCTIONS - ( 11 Aug 2021 06:50 )  Alb: 3.1 g/dL / Pro: 6.9 gm/dL / ALK PHOS: 64 U/L / ALT: 20 U/L / AST: 21 U/L / GGT: x           PT/INR - ( 11 Aug 2021 06:50 )   PT: 12.7 sec;   INR: 1.10 ratio           Urinalysis Basic - ( 10 Aug 2021 18:04 )    Color: Yellow / Appearance: Slightly Turbid / S.010 / pH: x  Gluc: x / Ketone: Trace  / Bili: Negative / Urobili: Negative mg/dL   Blood: x / Protein: 30 mg/dL / Nitrite: Negative   Leuk Esterase: Trace / RBC: 0-2 /HPF / WBC 3-5   Sq Epi: x / Non Sq Epi: Few / Bacteria: Occasional          Assessment and Plan:  89 year old female patient with weakness and debility found to have a UTI    # Weakness/ Debility/UTI:  -CT head no acute pathology  -c/w ceftriaxone  -f/u cultures  -PT eval      #DM2  -hold oral hypoglycemics  -a1c 7.3  -ISS    # HLD  -on statin    # Advanced Directives  -Full code    # Other  -son Michael Chau can be reached at [phone]  # DVT ppx  -heparin sq    
CC: Weakness  History of Present Illness:   89 year old female patient who lives alone and is fully independent presented to the ED with her son who reported that she had sudden onset generalized weakness after a fall while on the toilet. Patient ambulates without the need for assistive devices but was unable to get up due to generalized upper extremity weakness. Patient and son denied any head trauma, loc, neck pain, upper or lower extremity pain.  In the ED patient found to have a Ua consistent with a UTI. CT head noted for No acute intracranial hemorrhage, vasogenic edema, extra-axial collection or midline shift. Mild chronic microvascular changes and age-related involutional change.    8/11: Awake, lying in bed, feeling well.  Discussed plan of care.  Pt Denies fever, chills, N, V, abd pain, CP, SOB.  8/12: Had episode of rectal bleeding today.  Not feeling well, weak.  No fever, abd pain, chills, n, v.  8/13: Lying in bed, weak but otherwise doing okay.  Denies fever, chills, N, V, abd pain, CP, SOB.    REVIEW OF SYSTEMS: All other review of systems is negative unless indicated above.    Vital Signs Last 24 Hrs  T(C): 37.2 (13 Aug 2021 08:13), Max: 37.2 (13 Aug 2021 08:13)  T(F): 98.9 (13 Aug 2021 08:13), Max: 98.9 (13 Aug 2021 08:13)  HR: 101 (13 Aug 2021 08:13) (81 - 101)  BP: 125/63 (13 Aug 2021 08:13) (125/63 - 131/59)  BP(mean): --  RR: 18 (13 Aug 2021 08:13) (18 - 18)  SpO2: 97% (13 Aug 2021 08:13) (95% - 98%)    PHYSICAL EXAM:    Constitutional: NAD, awake and alert, frail, thin appearing  HEENT: PERR, EOMI, Normal Hearing, MMM  Neck: Soft and supple  Respiratory: Breath sounds are clear bilaterally, No wheezing, rales or rhonchi  Cardiovascular: S1 and S2, regular rate and rhythm, no Murmurs, gallops or rubs  Gastrointestinal: Bowel Sounds present, soft, nontender, nondistended, no guarding, no rebound  Extremities: No peripheral edema  Neurological: A/O x 3, no focal deficits in my limited exam    MEDICATIONS  (STANDING):  amLODIPine   Tablet 10 milliGRAM(s) Oral daily  aspirin enteric coated 325 milliGRAM(s) Oral daily  atorvastatin 10 milliGRAM(s) Oral at bedtime  cholecalciferol 1000 Unit(s) Oral daily  dextrose 40% Gel 15 Gram(s) Oral once  dextrose 5%. 1000 milliLiter(s) (50 mL/Hr) IV Continuous <Continuous>  dextrose 5%. 1000 milliLiter(s) (100 mL/Hr) IV Continuous <Continuous>  dextrose 50% Injectable 25 Gram(s) IV Push once  dextrose 50% Injectable 12.5 Gram(s) IV Push once  dextrose 50% Injectable 25 Gram(s) IV Push once  glucagon  Injectable 1 milliGRAM(s) IntraMuscular once  insulin lispro (ADMELOG) corrective regimen sliding scale   SubCutaneous three times a day before meals  levothyroxine 25 MICROGram(s) Oral daily  metoprolol succinate ER 50 milliGRAM(s) Oral daily  multivitamin 1 Tablet(s) Oral daily  polyethylene glycol 3350 17 Gram(s) Oral daily  senna 1 Tablet(s) Oral daily    MEDICATIONS  (PRN):  acetaminophen   Tablet .. 650 milliGRAM(s) Oral every 6 hours PRN Temp greater or equal to 38.5C (101.3F), Mild Pain (1 - 3)  aluminum hydroxide/magnesium hydroxide/simethicone Suspension 30 milliLiter(s) Oral every 4 hours PRN Dyspepsia  melatonin 3 milliGRAM(s) Oral at bedtime PRN Insomnia  ondansetron Injectable 4 milliGRAM(s) IV Push every 8 hours PRN Nausea and/or Vomiting                                10.2   10.62 )-----------( 288      ( 12 Aug 2021 07:10 )             31.8     08-12    140  |  107  |  16  ----------------------------<  128<H>  3.4<L>   |  29  |  0.42<L>    Ca    8.7      12 Aug 2021 07:10      CAPILLARY BLOOD GLUCOSE      POCT Blood Glucose.: 209 mg/dL (13 Aug 2021 11:44)  POCT Blood Glucose.: 194 mg/dL (13 Aug 2021 07:43)  POCT Blood Glucose.: 180 mg/dL (12 Aug 2021 21:41)  POCT Blood Glucose.: 121 mg/dL (12 Aug 2021 17:12)            Assessment and Plan:  89 year old female patient with weakness and debility found to have a UTI    # Weakness/ Debility/UTI:  -CT head no acute pathology  -c/w ceftriaxone day # 3 - stop further   -BCx no growth x 2  -PT     Bleeding internal hemorrhoids / constipation:   -evaluated by colorectal suggest conservative measures  -stop hep subc  -sitz bath, increase fiber  -bowel regimen   -H+H stable     #DM2  -hold oral hypoglycemics  -a1c 7.3  -ISS    # HLD  -on statin    # Advanced Directives  -Full code      # DVT ppx  -stop heparin sq due to bleeding hemorrhoids     Dispo:  Discharge to [facility] Monday.       
CC: Weakness  History of Present Illness:   89 year old female patient who lives alone and is fully independent presented to the ED with her son who reported that she had sudden onset generalized weakness after a fall while on the toilet. Patient ambulates without the need for assistive devices but was unable to get up due to generalized upper extremity weakness. Patient and son denied any head trauma, loc, neck pain, upper or lower extremity pain.  In the ED patient found to have a Ua consistent with a UTI. CT head noted for No acute intracranial hemorrhage, vasogenic edema, extra-axial collection or midline shift. Mild chronic microvascular changes and age-related involutional change.    : Awake, lying in bed, feeling well.  Discussed plan of care.  Pt Denies fever, chills, N, V, abd pain, CP, SOB.  : Had episode of rectal bleeding today.  Not feeling well, weak.  No fever, abd pain, chills, n, v.    REVIEW OF SYSTEMS: All other review of systems is negative unless indicated above.    Vital Signs Last 24 Hrs  T(C): 36.4 (12 Aug 2021 08:00), Max: 37.1 (12 Aug 2021 00:13)  T(F): 97.5 (12 Aug 2021 08:00), Max: 98.8 (12 Aug 2021 00:13)  HR: 92 (12 Aug 2021 08:00) (80 - 92)  BP: 134/60 (12 Aug 2021 08:00) (113/53 - 134/60)  BP(mean): --  RR: 19 (12 Aug 2021 08:00) (18 - 19)  SpO2: 94% (12 Aug 2021 08:00) (92% - 97%)    PHYSICAL EXAM:    Constitutional: NAD, awake and alert, frail, thin appearing  HEENT: PERR, EOMI, Normal Hearing, MMM  Neck: Soft and supple  Respiratory: Breath sounds are clear bilaterally, No wheezing, rales or rhonchi  Cardiovascular: S1 and S2, regular rate and rhythm, no Murmurs, gallops or rubs  Gastrointestinal: Bowel Sounds present, soft, nontender, nondistended, no guarding, no rebound  Extremities: No peripheral edema  Neurological: A/O x 3, no focal deficits in my limited exam    MEDICATIONS  (STANDING):  amLODIPine   Tablet 10 milliGRAM(s) Oral daily  aspirin enteric coated 325 milliGRAM(s) Oral daily  atorvastatin 10 milliGRAM(s) Oral at bedtime  cefTRIAXone Injectable. 1000 milliGRAM(s) IV Push every 24 hours  cholecalciferol 1000 Unit(s) Oral daily  dextrose 40% Gel 15 Gram(s) Oral once  dextrose 5%. 1000 milliLiter(s) (50 mL/Hr) IV Continuous <Continuous>  dextrose 5%. 1000 milliLiter(s) (100 mL/Hr) IV Continuous <Continuous>  dextrose 50% Injectable 25 Gram(s) IV Push once  dextrose 50% Injectable 12.5 Gram(s) IV Push once  dextrose 50% Injectable 25 Gram(s) IV Push once  glucagon  Injectable 1 milliGRAM(s) IntraMuscular once  insulin lispro (ADMELOG) corrective regimen sliding scale   SubCutaneous three times a day before meals  levothyroxine 25 MICROGram(s) Oral daily  metoprolol succinate ER 50 milliGRAM(s) Oral daily  multivitamin 1 Tablet(s) Oral daily    MEDICATIONS  (PRN):  acetaminophen   Tablet .. 650 milliGRAM(s) Oral every 6 hours PRN Temp greater or equal to 38.5C (101.3F), Mild Pain (1 - 3)  aluminum hydroxide/magnesium hydroxide/simethicone Suspension 30 milliLiter(s) Oral every 4 hours PRN Dyspepsia  melatonin 3 milliGRAM(s) Oral at bedtime PRN Insomnia  ondansetron Injectable 4 milliGRAM(s) IV Push every 8 hours PRN Nausea and/or Vomiting                              10.2   10.62 )-----------( 288      ( 12 Aug 2021 07:10 )             31.8     08-12    140  |  107  |  16  ----------------------------<  128<H>  3.4<L>   |  29  |  0.42<L>    Ca    8.7      12 Aug 2021 07:10  Phos  1.9     08-10  Mg     2.3     08-10    TPro  6.9  /  Alb  3.1<L>  /  TBili  0.3  /  DBili  x   /  AST  21  /  ALT  20  /  AlkPhos  64  08-11    CAPILLARY BLOOD GLUCOSE      POCT Blood Glucose.: 175 mg/dL (12 Aug 2021 12:16)  POCT Blood Glucose.: 185 mg/dL (12 Aug 2021 08:29)  POCT Blood Glucose.: 100 mg/dL (11 Aug 2021 21:19)  POCT Blood Glucose.: 162 mg/dL (11 Aug 2021 17:44)    LIVER FUNCTIONS - ( 11 Aug 2021 06:50 )  Alb: 3.1 g/dL / Pro: 6.9 gm/dL / ALK PHOS: 64 U/L / ALT: 20 U/L / AST: 21 U/L / GGT: x           PT/INR - ( 11 Aug 2021 06:50 )   PT: 12.7 sec;   INR: 1.10 ratio           Urinalysis Basic - ( 10 Aug 2021 18:04 )    Color: Yellow / Appearance: Slightly Turbid / S.010 / pH: x  Gluc: x / Ketone: Trace  / Bili: Negative / Urobili: Negative mg/dL   Blood: x / Protein: 30 mg/dL / Nitrite: Negative   Leuk Esterase: Trace / RBC: 0-2 /HPF / WBC 3-5   Sq Epi: x / Non Sq Epi: Few / Bacteria: Occasional        Assessment and Plan:  89 year old female patient with weakness and debility found to have a UTI    # Weakness/ Debility/UTI:  -CT head no acute pathology  -c/w ceftriaxone day # 2  -BCx no growth x 2  -PT     Bleeding internal hemorrhoids:   -evaluated by colorectal suggest conservative measures  -stop hep subc  -sitz bath, increase fiber  -H+H stable     #DM2  -hold oral hypoglycemics  -a1c 7.3  -ISS    # HLD  -on statin    # Advanced Directives  -Full code      # DVT ppx  -stop heparin sq due to bleeding hemorrhoids     Dispo:  Discharge to [facility] tomorrow if remains stable.  Discussed with son Michael and pt also agreeable.      
CC: Weakness  History of Present Illness:   89 year old female patient who lives alone and is fully independent presented to the ED with her son who reported that she had sudden onset generalized weakness after a fall while on the toilet. Patient ambulates without the need for assistive devices but was unable to get up due to generalized upper extremity weakness. Patient and son denied any head trauma, loc, neck pain, upper or lower extremity pain.  In the ED patient found to have a Ua consistent with a UTI. CT head noted for No acute intracranial hemorrhage, vasogenic edema, extra-axial collection or midline shift. Mild chronic microvascular changes and age-related involutional change.    8/11: Awake, lying in bed, feeling well.  Discussed plan of care.  Pt Denies fever, chills, N, V, abd pain, CP, SOB.  8/12: Had episode of rectal bleeding today.  Not feeling well, weak.  No fever, abd pain, chills, n, v.  8/13: Lying in bed, weak but otherwise doing okay.  Denies fever, chills, N, V, abd pain, CP, SOB.  8/14: Feeling weak, still has constipation.  No fever, chills or specific complaints. Denies abd pain.    REVIEW OF SYSTEMS: All other review of systems is negative unless indicated above.    Vital Signs Last 24 Hrs  T(C): 36.8 (14 Aug 2021 08:16), Max: 36.8 (14 Aug 2021 08:16)  T(F): 98.2 (14 Aug 2021 08:16), Max: 98.2 (14 Aug 2021 08:16)  HR: 94 (14 Aug 2021 08:16) (83 - 94)  BP: 130/54 (14 Aug 2021 08:16) (120/61 - 130/54)  BP(mean): --  RR: 17 (14 Aug 2021 08:16) (17 - 18)  SpO2: 97% (14 Aug 2021 08:16) (93% - 97%)    PHYSICAL EXAM:    Constitutional: NAD, awake and alert, frail, thin appearing  HEENT: PERR, EOMI, Normal Hearing, MMM  Neck: Soft and supple  Respiratory: Breath sounds are clear bilaterally, No wheezing, rales or rhonchi  Cardiovascular: S1 and S2, regular rate and rhythm, no Murmurs, gallops or rubs  Gastrointestinal: Bowel Sounds present, soft, nontender, nondistended, no guarding, no rebound  Extremities: No peripheral edema  Neurological: A/O x 3, no focal deficits in my limited exam    MEDICATIONS  (STANDING):  amLODIPine   Tablet 10 milliGRAM(s) Oral daily  atorvastatin 10 milliGRAM(s) Oral at bedtime  cholecalciferol 1000 Unit(s) Oral daily  dextrose 40% Gel 15 Gram(s) Oral once  dextrose 5%. 1000 milliLiter(s) (50 mL/Hr) IV Continuous <Continuous>  dextrose 5%. 1000 milliLiter(s) (100 mL/Hr) IV Continuous <Continuous>  dextrose 50% Injectable 25 Gram(s) IV Push once  dextrose 50% Injectable 12.5 Gram(s) IV Push once  dextrose 50% Injectable 25 Gram(s) IV Push once  glucagon  Injectable 1 milliGRAM(s) IntraMuscular once  insulin lispro (ADMELOG) corrective regimen sliding scale   SubCutaneous three times a day before meals  levothyroxine 25 MICROGram(s) Oral daily  metoprolol succinate ER 50 milliGRAM(s) Oral daily  multivitamin 1 Tablet(s) Oral daily  polyethylene glycol 3350 17 Gram(s) Oral daily  senna 1 Tablet(s) Oral daily    MEDICATIONS  (PRN):  acetaminophen   Tablet .. 650 milliGRAM(s) Oral every 6 hours PRN Temp greater or equal to 38.5C (101.3F), Mild Pain (1 - 3)  aluminum hydroxide/magnesium hydroxide/simethicone Suspension 30 milliLiter(s) Oral every 4 hours PRN Dyspepsia  melatonin 3 milliGRAM(s) Oral at bedtime PRN Insomnia  ondansetron Injectable 4 milliGRAM(s) IV Push every 8 hours PRN Nausea and/or Vomiting                  CAPILLARY BLOOD GLUCOSE      POCT Blood Glucose.: 240 mg/dL (14 Aug 2021 11:54)  POCT Blood Glucose.: 196 mg/dL (14 Aug 2021 08:24)  POCT Blood Glucose.: 177 mg/dL (13 Aug 2021 16:53)          Assessment and Plan:  89 year old female patient with weakness and debility found to have a UTI    # Weakness/ Debility/UTI:  -CT head no acute pathology  -s/p ceftriaxone x 3 days  -BCx no growth x 2  -PT     Bleeding internal hemorrhoids / constipation:   -evaluated by colorectal suggest conservative measures  -stop hep subc  -stop aspirin 325mg today   -sitz bath, increase fiber  -bowel regimen   -H+H stable     #DM2  -hold oral hypoglycemics  -a1c 7.3  -ISS    # HLD  -on statin    # Advanced Directives  -Full code      # DVT ppx  -stop heparin sq due to bleeding hemorrhoids     Dispo:  Discharge to [facility] Monday.       
CC: Weakness  History of Present Illness:   89 year old female patient who lives alone and is fully independent presented to the ED with her son who reported that she had sudden onset generalized weakness after a fall while on the toilet. Patient ambulates without the need for assistive devices but was unable to get up due to generalized upper extremity weakness. Patient and son denied any head trauma, loc, neck pain, upper or lower extremity pain.  In the ED patient found to have a Ua consistent with a UTI. CT head noted for No acute intracranial hemorrhage, vasogenic edema, extra-axial collection or midline shift. Mild chronic microvascular changes and age-related involutional change.    8/11: Awake, lying in bed, feeling well.  Discussed plan of care.  Pt Denies fever, chills, N, V, abd pain, CP, SOB.  8/12: Had episode of rectal bleeding today.  Not feeling well, weak.  No fever, abd pain, chills, n, v.  8/13: Lying in bed, weak but otherwise doing okay.  Denies fever, chills, N, V, abd pain, CP, SOB.  8/14: Feeling weak, still has constipation.  No fever, chills or specific complaints. Denies abd pain.  8/15: States less weak today, feeling better.  Had BM, no fever, chills, n, v, abd pain.    REVIEW OF SYSTEMS: All other review of systems is negative unless indicated above.    Vital Signs Last 24 Hrs  T(C): 36.6 (15 Aug 2021 07:24), Max: 36.6 (15 Aug 2021 07:24)  T(F): 97.8 (15 Aug 2021 07:24), Max: 97.8 (15 Aug 2021 07:24)  HR: 79 (15 Aug 2021 07:24) (64 - 94)  BP: 126/46 (15 Aug 2021 07:24) (122/61 - 140/53)  BP(mean): --  RR: 17 (15 Aug 2021 07:24) (17 - 17)  SpO2: 97% (15 Aug 2021 07:24) (94% - 97%)    PHYSICAL EXAM:    Constitutional: NAD, awake and alert, frail, thin appearing  HEENT: PERR, EOMI, Normal Hearing, MMM  Neck: Soft and supple  Respiratory: Breath sounds are clear bilaterally, No wheezing, rales or rhonchi  Cardiovascular: S1 and S2, regular rate and rhythm, no Murmurs, gallops or rubs  Gastrointestinal: Bowel Sounds present, soft, nontender, nondistended, no guarding, no rebound  Extremities: No peripheral edema  Neurological: A/O x 3, no focal deficits in my limited exam    MEDICATIONS  (STANDING):  amLODIPine   Tablet 10 milliGRAM(s) Oral daily  atorvastatin 10 milliGRAM(s) Oral at bedtime  cholecalciferol 1000 Unit(s) Oral daily  dextrose 40% Gel 15 Gram(s) Oral once  dextrose 5%. 1000 milliLiter(s) (50 mL/Hr) IV Continuous <Continuous>  dextrose 5%. 1000 milliLiter(s) (100 mL/Hr) IV Continuous <Continuous>  dextrose 50% Injectable 25 Gram(s) IV Push once  dextrose 50% Injectable 12.5 Gram(s) IV Push once  dextrose 50% Injectable 25 Gram(s) IV Push once  glucagon  Injectable 1 milliGRAM(s) IntraMuscular once  insulin lispro (ADMELOG) corrective regimen sliding scale   SubCutaneous three times a day before meals  levothyroxine 25 MICROGram(s) Oral daily  metoprolol succinate ER 50 milliGRAM(s) Oral daily  multivitamin 1 Tablet(s) Oral daily  polyethylene glycol 3350 17 Gram(s) Oral daily  senna 1 Tablet(s) Oral daily    MEDICATIONS  (PRN):  acetaminophen   Tablet .. 650 milliGRAM(s) Oral every 6 hours PRN Temp greater or equal to 38.5C (101.3F), Mild Pain (1 - 3)  aluminum hydroxide/magnesium hydroxide/simethicone Suspension 30 milliLiter(s) Oral every 4 hours PRN Dyspepsia  melatonin 3 milliGRAM(s) Oral at bedtime PRN Insomnia  ondansetron Injectable 4 milliGRAM(s) IV Push every 8 hours PRN Nausea and/or Vomiting                              10.3   10.55 )-----------( 340      ( 14 Aug 2021 16:52 )             31.0     08-14    136  |  103  |  16  ----------------------------<  222<H>  4.2   |  30  |  0.59    Ca    8.9      14 Aug 2021 16:52      CAPILLARY BLOOD GLUCOSE      POCT Blood Glucose.: 279 mg/dL (15 Aug 2021 12:04)  POCT Blood Glucose.: 238 mg/dL (15 Aug 2021 07:46)  POCT Blood Glucose.: 225 mg/dL (14 Aug 2021 17:36)            Assessment and Plan:  89 year old female patient with weakness and debility found to have a UTI    # Weakness/ Debility/UTI: slowly improving   -CT head no acute pathology  -s/p ceftriaxone x 3 days  -BCx no growth x 2  -PT     Bleeding internal hemorrhoids / constipation:   -evaluated by colorectal suggest conservative measures  -stopped hep subc and aspirin 325mg   -sitz bath, increase fiber  -bowel regimen   -H+H stable     #DM2  -hold oral hypoglycemics  -a1c 7.3  -ISS    # HLD  -on statin    # Advanced Directives  -Full code      # DVT ppx  -stop heparin sq due to bleeding hemorrhoids   -ambulation  -SCDs    Dispo:  Discharge to [facility] Monday.         
CC:  Patient is a 89y old  Female who presents with a chief complaint of Weakness/ Debility  UTI (16 Aug 2021 12:26)    SUBJECTIVE:     -no new complaints or issues at current time.    ROS:  all other review of systems are negative unless indicated above.    acetaminophen   Tablet .. 650 milliGRAM(s) Oral every 6 hours PRN  aluminum hydroxide/magnesium hydroxide/simethicone Suspension 30 milliLiter(s) Oral every 4 hours PRN  amLODIPine   Tablet 10 milliGRAM(s) Oral daily  atorvastatin 10 milliGRAM(s) Oral at bedtime  cholecalciferol 1000 Unit(s) Oral daily  dextrose 40% Gel 15 Gram(s) Oral once  dextrose 5%. 1000 milliLiter(s) IV Continuous <Continuous>  dextrose 5%. 1000 milliLiter(s) IV Continuous <Continuous>  dextrose 50% Injectable 25 Gram(s) IV Push once  dextrose 50% Injectable 12.5 Gram(s) IV Push once  dextrose 50% Injectable 25 Gram(s) IV Push once  glucagon  Injectable 1 milliGRAM(s) IntraMuscular once  hemorrhoidal Ointment 1 Application(s) Rectal two times a day  insulin lispro (ADMELOG) corrective regimen sliding scale   SubCutaneous three times a day before meals  levothyroxine 25 MICROGram(s) Oral daily  melatonin 3 milliGRAM(s) Oral at bedtime PRN  metoprolol succinate ER 50 milliGRAM(s) Oral daily  multivitamin 1 Tablet(s) Oral daily  ondansetron Injectable 4 milliGRAM(s) IV Push every 8 hours PRN  polyethylene glycol 3350 17 Gram(s) Oral daily  senna 1 Tablet(s) Oral daily    T(C): 36.3 (08-17-21 @ 07:23), Max: 36.6 (08-16-21 @ 16:19)  HR: 85 (08-17-21 @ 07:23) (84 - 86)  BP: 131/54 (08-17-21 @ 07:23) (117/49 - 131/54)  RR: 18 (08-17-21 @ 07:23) (18 - 18)  SpO2: 100% (08-17-21 @ 07:23) (95% - 100%)    Constitutional: NAD.   HEENT: PERRL, EOMI, MMM.  Neck: Soft and supple, No carotid bruit, No JVD  Respiratory: Breath sounds are clear bilaterally, No wheezing, rales or rhonchi  Cardiovascular: S1 and S2, regular rate and rhythm, no murmur, rub or gallop.  Gastrointestinal: Bowel Sounds present, soft, nontender, nondistended, no guarding, no rebound, no mass.  Extremities: No peripheral edema  Vascular: 2+ peripheral pulses  Neurological: A/O x , no focal deficits  Musculoskeletal: 5/5 strength b/l upper and lower extremities  Skin:  no visible rashes.

## 2021-08-17 NOTE — DISCHARGE NOTE NURSING/CASE MANAGEMENT/SOCIAL WORK - PATIENT PORTAL LINK FT
You can access the FollowMyHealth Patient Portal offered by [facility] by registering at the following website: [email or web address]. By joining [employer]’s FollowMyHealth portal, you will also be able to view your health information using other applications (apps) compatible with our system.

## 2021-08-17 NOTE — PROGRESS NOTE ADULT - REASON FOR ADMISSION
Weakness/ Debility  UTI

## 2021-08-22 DIAGNOSIS — R53.1 WEAKNESS: ICD-10-CM

## 2021-08-22 DIAGNOSIS — I10 ESSENTIAL (PRIMARY) HYPERTENSION: ICD-10-CM

## 2021-08-22 DIAGNOSIS — K59.00 CONSTIPATION, UNSPECIFIED: ICD-10-CM

## 2021-08-22 DIAGNOSIS — N39.0 URINARY TRACT INFECTION, SITE NOT SPECIFIED: ICD-10-CM

## 2021-08-22 DIAGNOSIS — K64.8 OTHER HEMORRHOIDS: ICD-10-CM

## 2021-08-22 DIAGNOSIS — E11.9 TYPE 2 DIABETES MELLITUS WITHOUT COMPLICATIONS: ICD-10-CM

## 2021-08-22 DIAGNOSIS — Z79.82 LONG TERM (CURRENT) USE OF ASPIRIN: ICD-10-CM

## 2021-08-22 DIAGNOSIS — Z79.84 LONG TERM (CURRENT) USE OF ORAL HYPOGLYCEMIC DRUGS: ICD-10-CM

## 2021-08-22 DIAGNOSIS — E78.5 HYPERLIPIDEMIA, UNSPECIFIED: ICD-10-CM

## 2021-08-22 DIAGNOSIS — R53.83 OTHER FATIGUE: ICD-10-CM

## 2022-03-05 NOTE — DISCHARGE NOTE PROVIDER - HOSPITAL COURSE
CC: Weakness  History of Present Illness:   89 year old female patient who lives alone and is fully independent presented to the ED with her son who reported that she had sudden onset generalized weakness after a fall while on the toilet. Patient ambulates without the need for assistive devices but was unable to get up due to generalized upper extremity weakness. Patient and son denied any head trauma, loc, neck pain, upper or lower extremity pain.  In the ED patient found to have a Ua consistent with a UTI. CT head noted for No acute intracranial hemorrhage, vasogenic edema, extra-axial collection or midline shift. Mild chronic microvascular changes and age-related involutional change.    Pt treated for weakness due to UTI - resolved after IV ceftriaxone.  BCx no growth.  Pt received PT with rec to go to [facility].  Hospital course complicated by bleeding ineternal hemorrhoids and constipation now improved.  H+H stable.  Per colorectal continue conservative measures.      REVIEW OF SYSTEMS: All other review of systems is negative unless indicated above.      Vital Signs Last 24 Hrs  T(C): 36.7 (16 Aug 2021 07:27), Max: 36.7 (16 Aug 2021 07:27)  T(F): 98 (16 Aug 2021 07:27), Max: 98 (16 Aug 2021 07:27)  HR: 86 (16 Aug 2021 07:27) (80 - 86)  BP: 123/50 (16 Aug 2021 07:27) (117/51 - 134/63)  BP(mean): --  RR: 17 (16 Aug 2021 07:27) (17 - 17)  SpO2: 97% (16 Aug 2021 07:27) (97% - 100%)    PHYSICAL EXAM:    Constitutional: NAD, awake and alert, well-developed  HEENT: PERR, EOMI, Normal Hearing, MMM  Neck: Soft and supple  Respiratory: Breath sounds are clear bilaterally, No wheezing, rales or rhonchi  Cardiovascular: S1 and S2, regular rate and rhythm, no Murmurs, gallops or rubs  Gastrointestinal: Bowel Sounds present, soft, nontender, nondistended, no guarding, no rebound  Extremities: No peripheral edema  Neurological: A/O x 3, no focal deficits in my limited exam    med/labs: Reviewed and interpreted         Assessment and Plan:  89 year old female patient with weakness and debility found to have a UTI    # Weakness/ Debility/UTI: slowly improving   -CT head no acute pathology  -s/p ceftriaxone x 3 days  -BCx no growth x 2  -PT     Bleeding internal hemorrhoids / constipation:   -evaluated by colorectal suggest conservative measures  -stopped hep subc and aspirin 325mg   -sitz bath, increase fiber  -bowel regimen   -H+H stable     #DM2  -hold oral hypoglycemics  -a1c 7.3  -ISS    # HLD  -on statin    # Advanced Directives  -Full code      # DVT ppx  -stop heparin sq due to bleeding hemorrhoids   -ambulation  -SCDs    Dispo:  Discharge to [facility]     Attending Statement: 40 minutes spent on total encounter and discharge planning.       CC: Weakness  History of Present Illness:   89 year old female patient who lives alone and is fully independent presented to the ED with her son who reported that she had sudden onset generalized weakness after a fall while on the toilet. Patient ambulates without the need for assistive devices but was unable to get up due to generalized upper extremity weakness. Patient and son denied any head trauma, loc, neck pain, upper or lower extremity pain.  In the ED patient found to have a Ua consistent with a UTI. CT head noted for No acute intracranial hemorrhage, vasogenic edema, extra-axial collection or midline shift. Mild chronic microvascular changes and age-related involutional change.    Pt treated for weakness due to UTI - resolved after IV ceftriaxone.  BCx no growth.  Pt received PT with rec to go to [facility].  Hospital course complicated by bleeding ineternal hemorrhoids and constipation now improved.  H+H stable.  Per colorectal continue conservative measures.      REVIEW OF SYSTEMS: All other review of systems is negative unless indicated above.      Vital Signs Last 24 Hrs  T(C): 36.7 (16 Aug 2021 07:27), Max: 36.7 (16 Aug 2021 07:27)  T(F): 98 (16 Aug 2021 07:27), Max: 98 (16 Aug 2021 07:27)  HR: 86 (16 Aug 2021 07:27) (80 - 86)  BP: 123/50 (16 Aug 2021 07:27) (117/51 - 134/63)  BP(mean): --  RR: 17 (16 Aug 2021 07:27) (17 - 17)  SpO2: 97% (16 Aug 2021 07:27) (97% - 100%)    PHYSICAL EXAM:    Constitutional: NAD, awake and alert, well-developed  HEENT: PERR, EOMI, Normal Hearing, MMM  Neck: Soft and supple  Respiratory: Breath sounds are clear bilaterally, No wheezing, rales or rhonchi  Cardiovascular: S1 and S2, regular rate and rhythm, no Murmurs, gallops or rubs  Gastrointestinal: Bowel Sounds present, soft, nontender, nondistended, no guarding, no rebound  Extremities: No peripheral edema  Neurological: A/O x 3, no focal deficits in my limited exam    med/labs: Reviewed and interpreted       89 year old female patient with weakness and debility found to have a UTI    # Weakness/ Debility/UTI: slowly improving   -CT head no acute pathology  -s/p ceftriaxone x 3 days  -BCx no growth x 2  -PT     Bleeding internal hemorrhoids / constipation:   -evaluated by colorectal suggest conservative measures  -stopped hep subc and aspirin 325mg   -sitz bath, increase fiber  -bowel regimen   -H+H stable     #DM2  -hold oral hypoglycemics  -a1c 7.3  -ISS    # HLD  -on statin    # Advanced Directives  -Full code      # DVT ppx  -stop heparin sq due to bleeding hemorrhoids   -ambulation  -SCDs    disposition.  - 5E.  - plan to ED patient home w/ home care and home PT.    communication.  - 5E RN.  - 5E CM.  - 08/17 patient's son at bedside.       DISCHARGE

## 2024-01-25 NOTE — H&P ADULT - MS GEN HX ROS MEA POS PC
Physical Therapy   Daily Treatment     Patient Name: Lewis Mohr  Age:  77 y.o., Sex:  male  Medical Record #: [id number]  Today's Date: 1/24/2024     Precautions  Precautions: Fall Risk;Swallow Precautions;Nasogastric Tube    Assessment    Pt found half out of bed, pushing self all directions with no discernable pattern; followed less than 25% of extremity commands to testing with max repetition; able to initiate stance with fair strength but [place] immediately requiring total assist for all mobility; will follow to progress needs SNF vs LTAC depending on 02 needs and goals of care;     Plan    Treatment Plan Status: Continue Current Treatment Plan  Type of Treatment: Bed Mobility, Equipment, Gait Training, Neuro Re-Education / Balance, Self Care / Home Evaluation, Therapeutic Activities, Therapeutic Exercise  Treatment Frequency: 4 Times per Week  Treatment Duration: Until Therapy Goals Met    DC Equipment Recommendations: Unable to determine at this time  Discharge Recommendations: SNF if remains in goals of care       Abridged Subjective/Objective     01/24/24 0950   Cognition    Cognition / Consciousness X   Speech/ Communication Mute;Nods Appropriately   Level of Consciousness Alert   Ability To Follow Commands Unable to Follow 1 Step Commands  (~ 25% to testing commands with max repetition)   Safety Awareness Impaired;Impulsive   Attention Impaired   Initiation Impaired   Comments found slumped to side of bed, head against the railing legs off; pushing self back to bed both directs upon sitting; no verbal output to questions   Balance   Sitting Balance (Static) Poor -   Sitting Balance (Dynamic) Trace +   Standing Balance (Static) Trace   Standing Balance (Dynamic) Trace   Weight Shift Sitting Poor   Weight Shift Standing Poor   Skilled Intervention Postural Facilitation;Sequencing;Tactile Cuing;Verbal Cuing   Comments B UE support in sitting/standing; wildly variable trunk balance depending on arousal; pushing  self all directions; initialy able to take weight but stops participating after a few seconds of standing; knee buckle requiring glute and quad facilitation   Bed Mobility    Supine to Sit Total Assist   Sit to Supine Total Assist   Gait Analysis   Gait Level Of Assist Unable to Participate   Functional Mobility   Sit to Stand Maximal Assist   Bed, Chair, Wheelchair Transfer Unable to Participate   Short Term Goals    Short Term Goal # 1 Pt will perform bed mobility with min A to progress function in 6 visits.   Goal Outcome # 1 goal not met   Short Term Goal # 2 Pt will perform sit<>stand with min A to progress towards functional transfers in 6 visits.   Goal Outcome # 2 Goal not met   Short Term Goal # 3 Pt will maintain seated balance at EOB with standby assist for 2 min to progress function in 6 visits.   Goal Outcome # 3 Goal not met            muscle weakness

## 2025-05-17 ENCOUNTER — INPATIENT (INPATIENT)
Facility: HOSPITAL | Age: 89
LOS: 3 days | Discharge: NURSING, REHAB OR LONG-TERM CARE | DRG: 594 | End: 2025-05-21
Attending: HOSPITALIST | Admitting: EMERGENCY MEDICINE
Payer: MEDICARE

## 2025-05-17 VITALS
HEART RATE: 67 BPM | OXYGEN SATURATION: 96 % | DIASTOLIC BLOOD PRESSURE: 49 MMHG | WEIGHT: 90.61 LBS | TEMPERATURE: 98 F | SYSTOLIC BLOOD PRESSURE: 126 MMHG | RESPIRATION RATE: 16 BRPM

## 2025-05-17 DIAGNOSIS — G47.00 INSOMNIA, UNSPECIFIED: ICD-10-CM

## 2025-05-17 DIAGNOSIS — L89.159 PRESSURE ULCER OF SACRAL REGION, UNSPECIFIED STAGE: ICD-10-CM

## 2025-05-17 DIAGNOSIS — A41.9 SEPSIS, UNSPECIFIED ORGANISM: ICD-10-CM

## 2025-05-17 DIAGNOSIS — K59.00 CONSTIPATION, UNSPECIFIED: ICD-10-CM

## 2025-05-17 DIAGNOSIS — E78.5 HYPERLIPIDEMIA, UNSPECIFIED: ICD-10-CM

## 2025-05-17 DIAGNOSIS — Z79.84 LONG TERM (CURRENT) USE OF ORAL HYPOGLYCEMIC DRUGS: ICD-10-CM

## 2025-05-17 DIAGNOSIS — R94.31 ABNORMAL ELECTROCARDIOGRAM [ECG] [EKG]: ICD-10-CM

## 2025-05-17 DIAGNOSIS — E11.65 TYPE 2 DIABETES MELLITUS WITH HYPERGLYCEMIA: ICD-10-CM

## 2025-05-17 DIAGNOSIS — I10 ESSENTIAL (PRIMARY) HYPERTENSION: ICD-10-CM

## 2025-05-17 DIAGNOSIS — K52.89 OTHER SPECIFIED NONINFECTIVE GASTROENTERITIS AND COLITIS: ICD-10-CM

## 2025-05-17 DIAGNOSIS — Z87.440 PERSONAL HISTORY OF URINARY (TRACT) INFECTIONS: ICD-10-CM

## 2025-05-17 DIAGNOSIS — L89.90 PRESSURE ULCER OF UNSPECIFIED SITE, UNSPECIFIED STAGE: ICD-10-CM

## 2025-05-17 DIAGNOSIS — R53.81 OTHER MALAISE: ICD-10-CM

## 2025-05-17 DIAGNOSIS — Z66 DO NOT RESUSCITATE: ICD-10-CM

## 2025-05-17 DIAGNOSIS — K62.89 OTHER SPECIFIED DISEASES OF ANUS AND RECTUM: ICD-10-CM

## 2025-05-17 DIAGNOSIS — E44.0 MODERATE PROTEIN-CALORIE MALNUTRITION: ICD-10-CM

## 2025-05-17 DIAGNOSIS — Z11.52 ENCOUNTER FOR SCREENING FOR COVID-19: ICD-10-CM

## 2025-05-17 DIAGNOSIS — Z79.890 HORMONE REPLACEMENT THERAPY: ICD-10-CM

## 2025-05-17 DIAGNOSIS — E03.9 HYPOTHYROIDISM, UNSPECIFIED: ICD-10-CM

## 2025-05-17 DIAGNOSIS — Z29.9 ENCOUNTER FOR PROPHYLACTIC MEASURES, UNSPECIFIED: ICD-10-CM

## 2025-05-17 DIAGNOSIS — R73.9 HYPERGLYCEMIA, UNSPECIFIED: ICD-10-CM

## 2025-05-17 PROBLEM — E07.9 DISORDER OF THYROID, UNSPECIFIED: Chronic | Status: ACTIVE | Noted: 2021-08-20

## 2025-05-17 PROBLEM — E11.9 TYPE 2 DIABETES MELLITUS WITHOUT COMPLICATIONS: Chronic | Status: ACTIVE | Noted: 2021-08-20

## 2025-05-17 LAB
ALBUMIN SERPL ELPH-MCNC: 3.4 G/DL — SIGNIFICANT CHANGE UP (ref 3.3–5)
ALP SERPL-CCNC: 60 U/L — SIGNIFICANT CHANGE UP (ref 40–120)
ALT FLD-CCNC: 17 U/L — SIGNIFICANT CHANGE UP (ref 12–78)
ANION GAP SERPL CALC-SCNC: 6 MMOL/L — SIGNIFICANT CHANGE UP (ref 5–17)
APPEARANCE UR: CLEAR — SIGNIFICANT CHANGE UP
AST SERPL-CCNC: 15 U/L — SIGNIFICANT CHANGE UP (ref 15–37)
BASOPHILS # BLD AUTO: 0.05 K/UL — SIGNIFICANT CHANGE UP (ref 0–0.2)
BASOPHILS NFR BLD AUTO: 0.4 % — SIGNIFICANT CHANGE UP (ref 0–2)
BILIRUB SERPL-MCNC: 0.3 MG/DL — SIGNIFICANT CHANGE UP (ref 0.2–1.2)
BILIRUB UR-MCNC: NEGATIVE — SIGNIFICANT CHANGE UP
BUN SERPL-MCNC: 23 MG/DL — SIGNIFICANT CHANGE UP (ref 7–23)
CALCIUM SERPL-MCNC: 9.7 MG/DL — SIGNIFICANT CHANGE UP (ref 8.5–10.1)
CHLORIDE SERPL-SCNC: 103 MMOL/L — SIGNIFICANT CHANGE UP (ref 96–108)
CK SERPL-CCNC: 43 U/L — SIGNIFICANT CHANGE UP (ref 26–192)
CO2 SERPL-SCNC: 28 MMOL/L — SIGNIFICANT CHANGE UP (ref 22–31)
COLOR SPEC: YELLOW — SIGNIFICANT CHANGE UP
CREAT SERPL-MCNC: 0.59 MG/DL — SIGNIFICANT CHANGE UP (ref 0.5–1.3)
DIFF PNL FLD: NEGATIVE — SIGNIFICANT CHANGE UP
EGFR: 81 ML/MIN/1.73M2 — SIGNIFICANT CHANGE UP
EGFR: 81 ML/MIN/1.73M2 — SIGNIFICANT CHANGE UP
EOSINOPHIL # BLD AUTO: 0.1 K/UL — SIGNIFICANT CHANGE UP (ref 0–0.5)
EOSINOPHIL NFR BLD AUTO: 0.7 % — SIGNIFICANT CHANGE UP (ref 0–6)
FLUAV AG NPH QL: SIGNIFICANT CHANGE UP
FLUBV AG NPH QL: SIGNIFICANT CHANGE UP
GLUCOSE BLDC GLUCOMTR-MCNC: 201 MG/DL — HIGH (ref 70–99)
GLUCOSE SERPL-MCNC: 200 MG/DL — HIGH (ref 70–99)
GLUCOSE UR QL: NEGATIVE MG/DL — SIGNIFICANT CHANGE UP
HCT VFR BLD CALC: 31.7 % — LOW (ref 34.5–45)
HGB BLD-MCNC: 10.2 G/DL — LOW (ref 11.5–15.5)
IMM GRANULOCYTES # BLD AUTO: 0.05 K/UL — SIGNIFICANT CHANGE UP (ref 0–0.07)
IMM GRANULOCYTES NFR BLD AUTO: 0.4 % — SIGNIFICANT CHANGE UP (ref 0–0.9)
KETONES UR QL: 80 MG/DL
LACTATE SERPL-SCNC: 0.7 MMOL/L — SIGNIFICANT CHANGE UP (ref 0.7–2)
LEUKOCYTE ESTERASE UR-ACNC: ABNORMAL
LYMPHOCYTES # BLD AUTO: 1.63 K/UL — SIGNIFICANT CHANGE UP (ref 1–3.3)
LYMPHOCYTES NFR BLD AUTO: 11.9 % — LOW (ref 13–44)
MAGNESIUM SERPL-MCNC: 2 MG/DL — SIGNIFICANT CHANGE UP (ref 1.6–2.6)
MCHC RBC-ENTMCNC: 29.7 PG — SIGNIFICANT CHANGE UP (ref 27–34)
MCHC RBC-ENTMCNC: 32.2 G/DL — SIGNIFICANT CHANGE UP (ref 32–36)
MCV RBC AUTO: 92.2 FL — SIGNIFICANT CHANGE UP (ref 80–100)
MONOCYTES # BLD AUTO: 0.97 K/UL — HIGH (ref 0–0.9)
MONOCYTES NFR BLD AUTO: 7.1 % — SIGNIFICANT CHANGE UP (ref 2–14)
NEUTROPHILS # BLD AUTO: 10.85 K/UL — HIGH (ref 1.8–7.4)
NEUTROPHILS NFR BLD AUTO: 79.5 % — HIGH (ref 43–77)
NITRITE UR-MCNC: NEGATIVE — SIGNIFICANT CHANGE UP
NRBC # BLD AUTO: 0 K/UL — SIGNIFICANT CHANGE UP (ref 0–0)
NRBC # FLD: 0 K/UL — SIGNIFICANT CHANGE UP (ref 0–0)
NRBC BLD AUTO-RTO: 0 /100 WBCS — SIGNIFICANT CHANGE UP (ref 0–0)
PH UR: 6 — SIGNIFICANT CHANGE UP (ref 5–8)
PHOSPHATE SERPL-MCNC: 2.3 MG/DL — LOW (ref 2.5–4.5)
PLATELET # BLD AUTO: 271 K/UL — SIGNIFICANT CHANGE UP (ref 150–400)
PMV BLD: 9.9 FL — SIGNIFICANT CHANGE UP (ref 7–13)
POTASSIUM SERPL-MCNC: 3.6 MMOL/L — SIGNIFICANT CHANGE UP (ref 3.5–5.3)
POTASSIUM SERPL-SCNC: 3.6 MMOL/L — SIGNIFICANT CHANGE UP (ref 3.5–5.3)
PROT SERPL-MCNC: 7.6 GM/DL — SIGNIFICANT CHANGE UP (ref 6–8.3)
PROT UR-MCNC: 30 MG/DL
RBC # BLD: 3.44 M/UL — LOW (ref 3.8–5.2)
RBC # FLD: 12.8 % — SIGNIFICANT CHANGE UP (ref 10.3–14.5)
RSV RNA NPH QL NAA+NON-PROBE: SIGNIFICANT CHANGE UP
SARS-COV-2 RNA SPEC QL NAA+PROBE: SIGNIFICANT CHANGE UP
SODIUM SERPL-SCNC: 137 MMOL/L — SIGNIFICANT CHANGE UP (ref 135–145)
SOURCE RESPIRATORY: SIGNIFICANT CHANGE UP
SP GR SPEC: 1.02 — SIGNIFICANT CHANGE UP (ref 1–1.03)
TROPONIN I, HIGH SENSITIVITY RESULT: 22.39 NG/L — SIGNIFICANT CHANGE UP
UROBILINOGEN FLD QL: 0.2 MG/DL — SIGNIFICANT CHANGE UP (ref 0.2–1)
WBC # BLD: 13.65 K/UL — HIGH (ref 3.8–10.5)
WBC # FLD AUTO: 13.65 K/UL — HIGH (ref 3.8–10.5)

## 2025-05-17 PROCEDURE — 82962 GLUCOSE BLOOD TEST: CPT

## 2025-05-17 PROCEDURE — 84145 PROCALCITONIN (PCT): CPT

## 2025-05-17 PROCEDURE — 71045 X-RAY EXAM CHEST 1 VIEW: CPT | Mod: 26

## 2025-05-17 PROCEDURE — 92523 SPEECH SOUND LANG COMPREHEN: CPT | Mod: GN

## 2025-05-17 PROCEDURE — 80048 BASIC METABOLIC PNL TOTAL CA: CPT

## 2025-05-17 PROCEDURE — 97162 PT EVAL MOD COMPLEX 30 MIN: CPT | Mod: GP

## 2025-05-17 PROCEDURE — 97116 GAIT TRAINING THERAPY: CPT | Mod: GP

## 2025-05-17 PROCEDURE — 83036 HEMOGLOBIN GLYCOSYLATED A1C: CPT

## 2025-05-17 PROCEDURE — 84443 ASSAY THYROID STIM HORMONE: CPT

## 2025-05-17 PROCEDURE — 74177 CT ABD & PELVIS W/CONTRAST: CPT

## 2025-05-17 PROCEDURE — 80053 COMPREHEN METABOLIC PANEL: CPT

## 2025-05-17 PROCEDURE — 83735 ASSAY OF MAGNESIUM: CPT

## 2025-05-17 PROCEDURE — 92610 EVALUATE SWALLOWING FUNCTION: CPT | Mod: GN

## 2025-05-17 PROCEDURE — 97530 THERAPEUTIC ACTIVITIES: CPT | Mod: GP

## 2025-05-17 PROCEDURE — 36415 COLL VENOUS BLD VENIPUNCTURE: CPT

## 2025-05-17 PROCEDURE — 93005 ELECTROCARDIOGRAM TRACING: CPT

## 2025-05-17 PROCEDURE — 84100 ASSAY OF PHOSPHORUS: CPT

## 2025-05-17 PROCEDURE — 99222 1ST HOSP IP/OBS MODERATE 55: CPT

## 2025-05-17 PROCEDURE — 86140 C-REACTIVE PROTEIN: CPT

## 2025-05-17 PROCEDURE — 99285 EMERGENCY DEPT VISIT HI MDM: CPT

## 2025-05-17 PROCEDURE — 85652 RBC SED RATE AUTOMATED: CPT

## 2025-05-17 PROCEDURE — 85027 COMPLETE CBC AUTOMATED: CPT

## 2025-05-17 PROCEDURE — 85025 COMPLETE CBC W/AUTO DIFF WBC: CPT

## 2025-05-17 RX ORDER — VANCOMYCIN HCL IN 5 % DEXTROSE 1.5G/250ML
750 PLASTIC BAG, INJECTION (ML) INTRAVENOUS ONCE
Refills: 0 | Status: COMPLETED | OUTPATIENT
Start: 2025-05-17 | End: 2025-05-17

## 2025-05-17 RX ORDER — GLUCAGON 3 MG/1
1 POWDER NASAL ONCE
Refills: 0 | Status: DISCONTINUED | OUTPATIENT
Start: 2025-05-17 | End: 2025-05-21

## 2025-05-17 RX ORDER — MELATONIN 5 MG
3 TABLET ORAL AT BEDTIME
Refills: 0 | Status: DISCONTINUED | OUTPATIENT
Start: 2025-05-17 | End: 2025-05-21

## 2025-05-17 RX ORDER — DEXTROSE 50 % IN WATER 50 %
25 SYRINGE (ML) INTRAVENOUS ONCE
Refills: 0 | Status: DISCONTINUED | OUTPATIENT
Start: 2025-05-17 | End: 2025-05-21

## 2025-05-17 RX ORDER — DEXTROSE 50 % IN WATER 50 %
15 SYRINGE (ML) INTRAVENOUS ONCE
Refills: 0 | Status: DISCONTINUED | OUTPATIENT
Start: 2025-05-17 | End: 2025-05-21

## 2025-05-17 RX ORDER — ONDANSETRON HCL/PF 4 MG/2 ML
4 VIAL (ML) INJECTION EVERY 8 HOURS
Refills: 0 | Status: DISCONTINUED | OUTPATIENT
Start: 2025-05-17 | End: 2025-05-21

## 2025-05-17 RX ORDER — CEFTRIAXONE 500 MG/1
1000 INJECTION, POWDER, FOR SOLUTION INTRAMUSCULAR; INTRAVENOUS EVERY 24 HOURS
Refills: 0 | Status: DISCONTINUED | OUTPATIENT
Start: 2025-05-18 | End: 2025-05-18

## 2025-05-17 RX ORDER — DEXTROSE 50 % IN WATER 50 %
12.5 SYRINGE (ML) INTRAVENOUS ONCE
Refills: 0 | Status: DISCONTINUED | OUTPATIENT
Start: 2025-05-17 | End: 2025-05-21

## 2025-05-17 RX ORDER — INSULIN LISPRO 100 U/ML
INJECTION, SOLUTION INTRAVENOUS; SUBCUTANEOUS AT BEDTIME
Refills: 0 | Status: DISCONTINUED | OUTPATIENT
Start: 2025-05-17 | End: 2025-05-21

## 2025-05-17 RX ORDER — ACETAMINOPHEN 500 MG/5ML
650 LIQUID (ML) ORAL ONCE
Refills: 0 | Status: COMPLETED | OUTPATIENT
Start: 2025-05-17 | End: 2025-05-18

## 2025-05-17 RX ORDER — SODIUM CHLORIDE 9 G/1000ML
1000 INJECTION, SOLUTION INTRAVENOUS
Refills: 0 | Status: DISCONTINUED | OUTPATIENT
Start: 2025-05-17 | End: 2025-05-21

## 2025-05-17 RX ORDER — INSULIN LISPRO 100 U/ML
INJECTION, SOLUTION INTRAVENOUS; SUBCUTANEOUS
Refills: 0 | Status: DISCONTINUED | OUTPATIENT
Start: 2025-05-17 | End: 2025-05-21

## 2025-05-17 RX ORDER — HEPARIN SODIUM 1000 [USP'U]/ML
5000 INJECTION INTRAVENOUS; SUBCUTANEOUS EVERY 12 HOURS
Refills: 0 | Status: DISCONTINUED | OUTPATIENT
Start: 2025-05-17 | End: 2025-05-21

## 2025-05-17 RX ORDER — MAGNESIUM, ALUMINUM HYDROXIDE 200-200 MG
30 TABLET,CHEWABLE ORAL EVERY 4 HOURS
Refills: 0 | Status: DISCONTINUED | OUTPATIENT
Start: 2025-05-17 | End: 2025-05-21

## 2025-05-17 RX ORDER — MAGNESIUM SULFATE 500 MG/ML
2 SYRINGE (ML) INJECTION ONCE
Refills: 0 | Status: COMPLETED | OUTPATIENT
Start: 2025-05-17 | End: 2025-05-17

## 2025-05-17 RX ORDER — VANCOMYCIN HCL IN 5 % DEXTROSE 1.5G/250ML
750 PLASTIC BAG, INJECTION (ML) INTRAVENOUS EVERY 24 HOURS
Refills: 0 | Status: DISCONTINUED | OUTPATIENT
Start: 2025-05-17 | End: 2025-05-19

## 2025-05-17 RX ORDER — VANCOMYCIN HCL IN 5 % DEXTROSE 1.5G/250ML
1000 PLASTIC BAG, INJECTION (ML) INTRAVENOUS ONCE
Refills: 0 | Status: DISCONTINUED | OUTPATIENT
Start: 2025-05-17 | End: 2025-05-17

## 2025-05-17 RX ORDER — VANCOMYCIN HCL IN 5 % DEXTROSE 1.5G/250ML
250 PLASTIC BAG, INJECTION (ML) INTRAVENOUS EVERY 12 HOURS
Refills: 0 | Status: DISCONTINUED | OUTPATIENT
Start: 2025-05-17 | End: 2025-05-17

## 2025-05-17 RX ORDER — CEFTRIAXONE 500 MG/1
1000 INJECTION, POWDER, FOR SOLUTION INTRAMUSCULAR; INTRAVENOUS ONCE
Refills: 0 | Status: COMPLETED | OUTPATIENT
Start: 2025-05-17 | End: 2025-05-17

## 2025-05-17 RX ADMIN — Medication 75 MILLILITER(S): at 23:01

## 2025-05-17 RX ADMIN — Medication 250 MILLIGRAM(S): at 16:29

## 2025-05-17 RX ADMIN — Medication 25 GRAM(S): at 23:01

## 2025-05-17 RX ADMIN — CEFTRIAXONE 1000 MILLIGRAM(S): 500 INJECTION, POWDER, FOR SOLUTION INTRAMUSCULAR; INTRAVENOUS at 16:27

## 2025-05-17 RX ADMIN — Medication 1250 MILLILITER(S): at 14:07

## 2025-05-17 RX ADMIN — HEPARIN SODIUM 5000 UNIT(S): 1000 INJECTION INTRAVENOUS; SUBCUTANEOUS at 23:00

## 2025-05-17 NOTE — ED PROVIDER NOTE - OBJECTIVE STATEMENT
89 y/o F with PMH of DM, HTN, HLD, hypothyroid presents with generalized weakness this week. Pt with son at bedside. He states patient was seen by PCP 6 days ago, started on amoxicillin for presumed UTI. He states she did have diarrhea earlier in the week, but that has subsided. States over the past 24 hours she's had difficulty standing and using her walker which prompted ED evaluation. No reported fever, nausea, vomiting at home. Pt denies CP, SOb, cough, abdominal pain. States she has pain in her rectum which she believes is hemorrhoids. 89 y/o F with PMH of DM, HTN, HLD, hypothyroid presents with generalized weakness this week. Pt with son at bedside. He states patient was seen by PCP 6 days ago, started on amoxicillin for presumed UTI. He states she did have diarrhea earlier in the week, but that has subsided. States over the past 24 hours she's had difficulty standing and using her walker which prompted ED evaluation. No reported fever, nausea, vomiting at home. Pt denies CP, SOb, cough, abdominal pain. States she has pain in her rectum which she believes is hemorrhoids.    MORELIA Jay MD, ED Attdg:  Case d/w ED PA.  Pt seen/evaluated in ED.  89 y/o F with PMH of DM, HTN, HLD, hypothyroid; BIB pvt car from home to ED re: 1 week progressively worsening general weakness, past hours unable to perform ADLs (+ lives alone) despite her walker assistance.  Pt c/o pain by rectum, suspicious of hemorrhoid.  Oral Amoxil this week for ? UTI. 89 y/o F with PMH of DM, HTN, HLD, hypothyroid presents with generalized weakness this week. Pt with son at bedside. He states patient was seen by PCP 6 days ago, started on amoxicillin for presumed UTI. He states she did have diarrhea earlier in the week, but that has subsided. States over the past 24 hours she's had difficulty standing and using her walker which prompted ED evaluation. No reported fever, nausea, vomiting at home. Pt denies CP, SOb, cough, abdominal pain. States she has pain in her rectum which she believes is hemorrhoids.    MORELIA Jay MD, ED Attdg:  Case d/w ED PA.  Pt seen/evaluated in ED.  89 y/o F with PMH of DM, HTN, HLD, hypothyroid; BIB pvt car from home to ED re: 1 week progressively worsening general weakness, ~ 24 hours unable to perform ADLs (+ lives alone) despite her walker assistance.  Pt c/o pain by rectum, suspicious of hemorrhoid.  Oral Amoxil this week for ? UTI.

## 2025-05-17 NOTE — PATIENT PROFILE ADULT - FUNCTIONAL ASSESSMENT - BASIC MOBILITY 6.
2-calculated by average/Not able to assess (calculate score using [facility] averaging method)

## 2025-05-17 NOTE — ED ADULT NURSE REASSESSMENT NOTE - NS ED NURSE REASSESS COMMENT FT1
Rec'd from [facility], pt resting comfortably with family at bedside. Updated on plan of care - waiting for bed.

## 2025-05-17 NOTE — ED ADULT NURSE NOTE - OBJECTIVE STATEMENT
pt brought to ED due to  weakness and fatigue as per family pt has had  trouble walking pain in rectum. dx recently with  uti on PO  abx started last night. andi healed sore on right buttock

## 2025-05-17 NOTE — ED ADULT NURSE NOTE - NSFALLLASTSIX_ED_ALL_ED
Ongoing SW/CM Assessment/Plan of Care Note     See SW/CM flowsheets for goals and other objective data.    Patient/Family discharge goal (s):  Goal #1: Psychosocial needs assessed  Goal #2: Establish present or future health care decison-maker  Goal #3: Communication facilitated    PT Recommendation:  Recommendation for Discharge: PT WI: Post acute therapy    OT Recommendation:  Recommendations for Discharge: OT WI: Post acute therapy    SLP Recommendation:  Recommendations for Discharge: SLP: per PT/OT    Disposition:   AJ    Progress note:   Received message from Deneen @ [facility], they are not in network with pt's insurance. Not able to do one time contract.   New referrals faxed to [facility], [facility], [facility] and [facility]. Following.     Isaiah updated and in agreement with referrals. Isaiah would prefer something close to home, no preference on facility. Discussed that POA will be activated and Isaiah is the primary agent. Activation on pt's chart for MD to sign.     Isaiah reports he spoke to pt about rehab last night and she was agreeable.   Following.     [facility] is not in network with pt's insurance.     Received update from [facility] they are able to accept patient. Admissions will start auth and is aware pt will be ready for dc when auth is received. Isaiah updated.        No.

## 2025-05-17 NOTE — H&P ADULT - NSHPPHYSICALEXAM_GEN_ALL_CORE
T(C): 36.8 (05-17-25 @ 19:24), Max: 36.8 (05-17-25 @ 19:24)  HR: 97 (05-17-25 @ 19:24) (67 - 102)  BP: 143/73 (05-17-25 @ 19:24) (124/59 - 143/73)  RR: 15 (05-17-25 @ 19:24) (13 - 20)  SpO2: 94% (05-17-25 @ 19:24) (92% - 96%)    General: non-toxic  HEENT: non-traumatic, perrla, eomi  Cardio: s1s2 regular rate and rhythm  Lungs: comfortable breathing, clear to auscultation  Abdomen: Soft, non-tender, non-distended  Neuro: AOx4  Ext: Pulses +2

## 2025-05-17 NOTE — H&P ADULT - PROBLEM SELECTOR PLAN 1
Fatigue possible 2/2 recent UTI, vs sacral infected ulcer.   Outpt Urine C+S this past week: + Enterococcus faciae 40k per HPF, + pansensitive.  Cover with vancomycin and ceftriaxone  Air fluidized mattress, offload heels, turn q2h, wound care consult.   ID consult. Fatigue possible 2/2 recent UTI, vs sacral infected ulcer.   Outpt Urine C+S this past week: + Enterococcus faciae 40k per HPF, + pansensitive.  Cover with vancomycin and ceftriaxone  Obtain CT abd/pelvis for further eval.   Air fluidized mattress, offload heels, turn q2h, wound care consult.   ID consult.

## 2025-05-17 NOTE — ED PROVIDER NOTE - ATTENDING APP SHARED VISIT CONTRIBUTION OF CARE
BIBIANA Jay MD, ED Attending physician:  This was a shared visit with ZEKE.  I reviewed and verified the documentation and independently performed the documented history/exam/mdm.

## 2025-05-17 NOTE — H&P ADULT - PROBLEM SELECTOR PLAN 6
DVT ppx: hepsubq.  Fall and aspiration precautions.  Turn and reposition q2h to prevent bedsores  Skin checks as per RN

## 2025-05-17 NOTE — ED PROVIDER NOTE - PHYSICAL EXAMINATION
Gen: Well appearing. A&O x3.   HEENT: NC/AT. Dentition in poor repair. No oropharyngeal erythema, tonsilar enlargement or exudates. Uvula midline. No submandibular or anterior cervical lymphadenopathy. Nares patent.  Cardiac: s1s2, RRR.  Lungs: CTAB, no chest wall tenderness.  Abdomen: NBS x4, soft, nontender.   MSK: No obvious deformity to extremities. No midline spinal tenderness or tenderness over bony landmarks on extremities. 5/5 upper and lower extremity strength. Full ROM in all extremities  Neuro: CN II-XII intact. Sensation intact to light touch in all extremities. 5/5 strength in all extremities.   PV: No LE edema or calf tenderness. Distal pulses 2+ in all extremities. Gen: Well appearing. A&O x3.   HEENT: NC/AT. Dentition in poor repair. No oropharyngeal erythema, tonsilar enlargement or exudates. Uvula midline. No submandibular or anterior cervical lymphadenopathy. Nares patent.  Cardiac: s1s2, RRR.  Lungs: CTAB, no chest wall tenderness.  Abdomen: NBS x4, soft, nontender.   MSK: No obvious deformity to extremities. No midline spinal tenderness or tenderness over bony landmarks on extremities. 5/5 upper and lower extremity strength. Full ROM in all extremities  Neuro: CN II-XII intact. Sensation intact to light touch in all extremities. 5/5 strength in all extremities.   PV: No LE edema or calf tenderness. Distal pulses 2+ in all extremities.    MORELIA Jay MD:  Gen'l: Elderly WF adult, alert, no respiratory discomfort, no sentence shortening, mildly ill-appearing.  Head: NC/AT  Eyes: PERRL, EOMI  ENT: O/P clear, mm mildly dry  CV: RRR, normal radial pulse  Lungs: CTA, normal respirations  GI: soft, NT, BS+  : Deferred, no flank nor CVAT  Neck: NT, supple w/o pain  MSK: WEIR x 4, no focal extremity swelling nor tenderness, B/L SLR 35 degrees w/o pain, normal motor  Skin: no tactile warmth, no rash.  Sacral decubitus ulcer with surrounding erythema, + TTP, no fluctuance.  Neuro: Alert, CN 2 -12 intact, normal speech, no focal motor/sensory deficits

## 2025-05-17 NOTE — H&P ADULT - CONVERSATION DETAILS
A goals of care discussion was held with the patient’s son, who serves as the primary decision-maker.  The conversation included a review of the patient’s current medical condition, prognosis, and available treatment options, discussing the potential benefits and burdens of various interventions.  After thorough discussion, the patient’s son expressed the decision to pursue a DNR/DNI status, opting against cardiopulmonary resuscitation and intubation in the event of cardiac or respiratory arrest. However, he requested non-invasive positive pressure ventilation as a supportive measure if needed.  The family confirmed their understanding of the implications of this decision, including comfort-focused care and supportive interventions as deemed appropriate by the medical team. The medical team acknowledged and documented this decision, ensuring alignment with the patient's wishes and best interests as determined by the family.

## 2025-05-17 NOTE — ED PROVIDER NOTE - PROGRESS NOTE DETAILS
Pt with apx 1cm sacral ulcer with overlying scab. - Polo Ely PA-C CC: Outpt Urine C+S this past week: + Enterococcus faciae 40k per HPF, + pansensitive.

## 2025-05-17 NOTE — H&P ADULT - HISTORY OF PRESENT ILLNESS
89F with PMH of DM, HTN, HLD, hypothyroidism presents with generalized weakness over the past week. Son at bedside reports pt was evaluated by PCP 6 days ago and started on amoxicillin for presumed UTI. Previously experienced diarrhea earlier in the week, which has since resolved. Over the past 24 hours, pt has had difficulty standing and using her walker, prompting ED evaluation. No reported fever, nausea, or vomiting at home. Denies CP, SOB, cough, or abdominal pain. Reports rectal pain, suspected to be secondary to hemorrhoids.

## 2025-05-17 NOTE — ED PROVIDER NOTE - NS ED ROS FT
GEN: +generalized weakness. Denies fever, chills, sick contacts, recent travel  HEENT: Denies dizziness, blurry vision, HA  Cardiac: Denies CP, SOB, palpitations  Lungs: Denies cough, wheezing  PV: Denies LE swelling  GI: Denies nausea, vomiting, diarrhea, constipation, flank pain  : Denies hematuria, dysuria, frequency, urgency  Skin: Denies rash, redness, open wound  MSK: Denies pain, decreased ROM  Neuro: Denies dizziness, difficulty speaking, difficulty swallowing, numbness/tingling in extremities, loss of bowel/bladder control, weakness in extremities

## 2025-05-17 NOTE — H&P ADULT - PROBLEM SELECTOR PLAN 5
Patient was told to call if she needed stronger pain medication for shingles.  Patient would like something stronger for night time. Patient is having trouble sleeping.      Pharmacy:  [facility]     Please advise when processed    DVT ppx: hepsubq. DVT ppx: hepsubq.  Fall and aspiration precautions.  Turn and reposition q2h to prevent bedsores  Skin checks as per RN Give Mag 2g IVPB  Repeat ECG in am

## 2025-05-17 NOTE — PATIENT PROFILE ADULT - FALL HARM RISK - HARM RISK INTERVENTIONS

## 2025-05-17 NOTE — PATIENT PROFILE ADULT - FUNCTIONAL ASSESSMENT - DAILY ACTIVITY 2.
Add Famotidine TWICE daily (for hives)  Continue desloratadine twice daily     ADD nasal antihistamine    Continue Advair 1 puff daily. With colds can increase 2 puff TWICE per day    3 = A little assistance

## 2025-05-17 NOTE — ED ADULT NURSE NOTE - CHIEF COMPLAINT QUOTE
sib md for weakness and fatigue, trouble walking pain in rectum. dx with uti on abx started last night. pmh: dmII, htn, hypothyroid

## 2025-05-17 NOTE — H&P ADULT - NSHPLABSRESULTS_GEN_ALL_CORE
LABS:  cret                        10.2   13.65 )-----------( 271      ( 17 May 2025 13:40 )             31.7     05-17    137  |  103  |  23  ----------------------------<  200[H]  3.6   |  28  |  0.59    Ca    9.7      17 May 2025 13:40  Phos  2.3     05-17  Mg     2.0     05-17    TPro  7.6  /  Alb  3.4  /  TBili  0.3  /  DBili  x   /  AST  15  /  ALT  17  /  AlkPhos  60  05-17    Urinalysis with Rflx Culture (collected 05-17-25 @ 15:09)

## 2025-05-17 NOTE — ED PROVIDER NOTE - CLINICAL SUMMARY MEDICAL DECISION MAKING FREE TEXT BOX
Plan: EKG, CXR, labs incl pan-culture, lactate, U/A, viral swab.  Observe, reassess.    CC: CXR wet read JUDITH.  Labs notable for WBC elev at 13.6, lactate neg., viral swab neg. U/A neg. IV Vanxco/Ceftriaxone in progress. Pt not septic. Pt failed ambulation trial, not acceptable candidate for outpt management. ED PA submitted admit communication. 89 y/o F with PMH of DM, HTN, HLD, hypothyroid; BIB pvt car from home to ED re: 1 week progressively worsening general weakness, ~ 24 hours unable to perform ADLs (+ lives alone) despite her walker assistance.  Pt c/o pain by rectum, Oral Amoxil this week for ? UTI.  Exam: + Mildly ill, +sacral decubitus ulcer with surrounding erythema, + TTP, no fluctuance.    Plan: EKG, CXR, labs incl pan-culture, lactate, U/A, viral swab.  Observe, reassess.    CC: CXR wet read JUDITH.  Labs notable for WBC elev at 13.6, lactate neg., viral swab neg. U/A neg. IV Vanco/Ceftriaxone in progress. Pt not septic. Pt failed ambulation trial, not acceptable candidate for outpt management. ED PA submitted admit communication.

## 2025-05-18 DIAGNOSIS — E03.9 HYPOTHYROIDISM, UNSPECIFIED: ICD-10-CM

## 2025-05-18 DIAGNOSIS — R94.31 ABNORMAL ELECTROCARDIOGRAM [ECG] [EKG]: ICD-10-CM

## 2025-05-18 DIAGNOSIS — I10 ESSENTIAL (PRIMARY) HYPERTENSION: ICD-10-CM

## 2025-05-18 LAB
A1C WITH ESTIMATED AVERAGE GLUCOSE RESULT: 8.5 % — HIGH (ref 4–5.6)
ADD ON TEST-SPECIMEN IN LAB: SIGNIFICANT CHANGE UP
ALBUMIN SERPL ELPH-MCNC: 2.8 G/DL — LOW (ref 3.3–5)
ALP SERPL-CCNC: 51 U/L — SIGNIFICANT CHANGE UP (ref 40–120)
ALT FLD-CCNC: 14 U/L — SIGNIFICANT CHANGE UP (ref 12–78)
ANION GAP SERPL CALC-SCNC: 6 MMOL/L — SIGNIFICANT CHANGE UP (ref 5–17)
AST SERPL-CCNC: 21 U/L — SIGNIFICANT CHANGE UP (ref 15–37)
BILIRUB SERPL-MCNC: 0.3 MG/DL — SIGNIFICANT CHANGE UP (ref 0.2–1.2)
BUN SERPL-MCNC: 10 MG/DL — SIGNIFICANT CHANGE UP (ref 7–23)
CALCIUM SERPL-MCNC: 8.8 MG/DL — SIGNIFICANT CHANGE UP (ref 8.5–10.1)
CHLORIDE SERPL-SCNC: 107 MMOL/L — SIGNIFICANT CHANGE UP (ref 96–108)
CO2 SERPL-SCNC: 26 MMOL/L — SIGNIFICANT CHANGE UP (ref 22–31)
CREAT SERPL-MCNC: 0.36 MG/DL — LOW (ref 0.5–1.3)
CRP SERPL-MCNC: 28.1 MG/L — HIGH (ref 0–5)
EGFR: 92 ML/MIN/1.73M2 — SIGNIFICANT CHANGE UP
EGFR: 92 ML/MIN/1.73M2 — SIGNIFICANT CHANGE UP
ERYTHROCYTE [SEDIMENTATION RATE] IN BLOOD: 53 MM/HR — HIGH (ref 0–20)
ESTIMATED AVERAGE GLUCOSE: 197 MG/DL — HIGH (ref 68–114)
GLUCOSE SERPL-MCNC: 104 MG/DL — HIGH (ref 70–99)
HCT VFR BLD CALC: 30.9 % — LOW (ref 34.5–45)
HGB BLD-MCNC: 10 G/DL — LOW (ref 11.5–15.5)
MAGNESIUM SERPL-MCNC: 2.2 MG/DL — SIGNIFICANT CHANGE UP (ref 1.6–2.6)
MCHC RBC-ENTMCNC: 29.9 PG — SIGNIFICANT CHANGE UP (ref 27–34)
MCHC RBC-ENTMCNC: 32.4 G/DL — SIGNIFICANT CHANGE UP (ref 32–36)
MCV RBC AUTO: 92.5 FL — SIGNIFICANT CHANGE UP (ref 80–100)
NRBC # BLD AUTO: 0 K/UL — SIGNIFICANT CHANGE UP (ref 0–0)
NRBC # FLD: 0 K/UL — SIGNIFICANT CHANGE UP (ref 0–0)
NRBC BLD AUTO-RTO: 0 /100 WBCS — SIGNIFICANT CHANGE UP (ref 0–0)
PHOSPHATE SERPL-MCNC: 2.5 MG/DL — SIGNIFICANT CHANGE UP (ref 2.5–4.5)
PLATELET # BLD AUTO: 265 K/UL — SIGNIFICANT CHANGE UP (ref 150–400)
PMV BLD: 9.9 FL — SIGNIFICANT CHANGE UP (ref 7–13)
POTASSIUM SERPL-MCNC: 3.1 MMOL/L — LOW (ref 3.5–5.3)
POTASSIUM SERPL-SCNC: 3.1 MMOL/L — LOW (ref 3.5–5.3)
PROCALCITONIN SERPL-MCNC: 0.05 NG/ML — SIGNIFICANT CHANGE UP (ref 0.02–0.1)
PROT SERPL-MCNC: 6.6 GM/DL — SIGNIFICANT CHANGE UP (ref 6–8.3)
RBC # BLD: 3.34 M/UL — LOW (ref 3.8–5.2)
RBC # FLD: 12.8 % — SIGNIFICANT CHANGE UP (ref 10.3–14.5)
SODIUM SERPL-SCNC: 139 MMOL/L — SIGNIFICANT CHANGE UP (ref 135–145)
WBC # BLD: 10.76 K/UL — HIGH (ref 3.8–10.5)
WBC # FLD AUTO: 10.76 K/UL — HIGH (ref 3.8–10.5)

## 2025-05-18 PROCEDURE — 74177 CT ABD & PELVIS W/CONTRAST: CPT | Mod: 26

## 2025-05-18 PROCEDURE — 99233 SBSQ HOSP IP/OBS HIGH 50: CPT

## 2025-05-18 PROCEDURE — 93010 ELECTROCARDIOGRAM REPORT: CPT

## 2025-05-18 RX ORDER — CEFTRIAXONE 500 MG/1
2000 INJECTION, POWDER, FOR SOLUTION INTRAMUSCULAR; INTRAVENOUS EVERY 24 HOURS
Refills: 0 | Status: DISCONTINUED | OUTPATIENT
Start: 2025-05-18 | End: 2025-05-19

## 2025-05-18 RX ORDER — TRAMADOL HYDROCHLORIDE 50 MG/1
25 TABLET, FILM COATED ORAL ONCE
Refills: 0 | Status: DISCONTINUED | OUTPATIENT
Start: 2025-05-18 | End: 2025-05-18

## 2025-05-18 RX ORDER — METOPROLOL SUCCINATE 50 MG/1
50 TABLET, EXTENDED RELEASE ORAL DAILY
Refills: 0 | Status: DISCONTINUED | OUTPATIENT
Start: 2025-05-18 | End: 2025-05-21

## 2025-05-18 RX ORDER — AMLODIPINE BESYLATE 10 MG/1
10 TABLET ORAL DAILY
Refills: 0 | Status: DISCONTINUED | OUTPATIENT
Start: 2025-05-18 | End: 2025-05-21

## 2025-05-18 RX ORDER — LEVOTHYROXINE SODIUM 300 MCG
25 TABLET ORAL DAILY
Refills: 0 | Status: DISCONTINUED | OUTPATIENT
Start: 2025-05-18 | End: 2025-05-21

## 2025-05-18 RX ORDER — TRAMADOL HYDROCHLORIDE 50 MG/1
25 TABLET, FILM COATED ORAL EVERY 8 HOURS
Refills: 0 | Status: DISCONTINUED | OUTPATIENT
Start: 2025-05-18 | End: 2025-05-21

## 2025-05-18 RX ADMIN — INSULIN LISPRO 6: 100 INJECTION, SOLUTION INTRAVENOUS; SUBCUTANEOUS at 17:58

## 2025-05-18 RX ADMIN — Medication 40 MILLIEQUIVALENT(S): at 08:54

## 2025-05-18 RX ADMIN — Medication 250 MILLIGRAM(S): at 16:55

## 2025-05-18 RX ADMIN — Medication 40 MILLIEQUIVALENT(S): at 12:03

## 2025-05-18 RX ADMIN — Medication 650 MILLIGRAM(S): at 16:08

## 2025-05-18 RX ADMIN — AMLODIPINE BESYLATE 10 MILLIGRAM(S): 10 TABLET ORAL at 12:03

## 2025-05-18 RX ADMIN — METOPROLOL SUCCINATE 50 MILLIGRAM(S): 50 TABLET, EXTENDED RELEASE ORAL at 12:03

## 2025-05-18 RX ADMIN — Medication 650 MILLIGRAM(S): at 15:08

## 2025-05-18 RX ADMIN — Medication 25 MICROGRAM(S): at 06:14

## 2025-05-18 RX ADMIN — Medication 3 MILLIGRAM(S): at 22:33

## 2025-05-18 RX ADMIN — HEPARIN SODIUM 5000 UNIT(S): 1000 INJECTION INTRAVENOUS; SUBCUTANEOUS at 22:32

## 2025-05-18 RX ADMIN — HEPARIN SODIUM 5000 UNIT(S): 1000 INJECTION INTRAVENOUS; SUBCUTANEOUS at 12:03

## 2025-05-18 RX ADMIN — CEFTRIAXONE 2000 MILLIGRAM(S): 500 INJECTION, POWDER, FOR SOLUTION INTRAMUSCULAR; INTRAVENOUS at 18:58

## 2025-05-18 RX ADMIN — TRAMADOL HYDROCHLORIDE 25 MILLIGRAM(S): 50 TABLET, FILM COATED ORAL at 16:55

## 2025-05-18 RX ADMIN — TRAMADOL HYDROCHLORIDE 25 MILLIGRAM(S): 50 TABLET, FILM COATED ORAL at 17:25

## 2025-05-18 NOTE — SWALLOW BEDSIDE ASSESSMENT ADULT - COMMENTS
The pt was admitted to  with generalized weakness which was precipitated by a recent UTI. Hospital course is remarkable for prolonged QT and presence of a sacral wound that may be infected. This profile is superimposed upon a h/p HTN, HLD, DM, and Thyroid Disease NOS. The pt was admitted to  with generalized weakness which was precipitated by a recent UTI. Hospital course is remarkable for prolonged QT, leukocytosis, and presence of a sacral wound that may be infected. This profile is superimposed upon a h/p HTN, HLD, DM, and Thyroid Disease NOS.

## 2025-05-18 NOTE — DIETITIAN INITIAL EVALUATION ADULT - NSFNSPHYEXAMSKINFT_GEN_A_CORE
Armando score = 14  skin- ecchymotic, redness blanchable/non-blanchable   Pressure Injury 1: Left:, buttocks, Stage II  Pressure Injury 2: Right:, buttocks, Stage I  Pressure Injury 3: Left:, heel, Stage I  Pressure Injury 4: Right:, heel, Stage I

## 2025-05-18 NOTE — DIETITIAN INITIAL EVALUATION ADULT - ADD RECOMMEND
1) C/w regular diet. Encourage protein-rich foods, maximize food preferences   2) Will add Ensure + HP shake daily to optimize nutritional needs (provides 350 kcal, 20g protein/ shake)   3) Monitor bowel movements, if no BM for >3 days, consider implementing bowel regimen.  4) Recommend to add MVI w/minerals, Vit C 500 mg BID, add Zinc Sulfate 220 mg x 10 days to promote wound healing.  5) Consider adding thiamine 100 mg daily 2/2 poor PO intake/ malnutrition  6) Obtain weekly wt to track/trend changes  7) Pt is DNR/DNI, however TF is NOT addressed in [facility]. Confirm goals of care regarding nutrition support   Nutrition recommendations to continue upon discharge. Goal to continue to meet >/= 80% ENN via tolerated route. RD to F/U prn for changes to nutrition dc plan. RD will continue to monitor PO intake, labs, hydration, and wt prn.  1) C/w regular diet w/ regular texture as per SLP recs. Encourage protein-rich foods, maximize food preferences   2) Will add Ensure + HP shake daily to optimize nutritional needs (provides 350 kcal, 20g protein/ shake)   3) Monitor bowel movements, if no BM for >3 days, consider implementing bowel regimen.  4) Recommend to add MVI w/minerals, Vit C 500 mg BID, add Zinc Sulfate 220 mg x 10 days to promote wound healing.  5) Consider adding thiamine 100 mg daily 2/2 poor PO intake/ malnutrition  6) Obtain weekly wt to track/trend changes  7) Pt is DNR/DNI, however TF is NOT addressed in [facility]. Confirm goals of care regarding nutrition support   Nutrition recommendations to continue upon discharge. Goal to continue to meet >/= 80% ENN via tolerated route. RD to F/U prn for changes to nutrition dc plan. RD will continue to monitor PO intake, labs, hydration, and wt prn.

## 2025-05-18 NOTE — DIETITIAN NUTRITION RISK NOTIFICATION - ADDITIONAL COMMENTS/DIETITIAN RECOMMENDATIONS
1) C/w regular diet w/ regular texture as per SLP recs. Encourage protein-rich foods, maximize food preferences   2) Will add Ensure + HP shake daily to optimize nutritional needs (provides 350 kcal, 20g protein/ shake)   3) Monitor bowel movements, if no BM for >3 days, consider implementing bowel regimen.  4) Recommend to add MVI w/minerals, Vit C 500 mg BID, add Zinc Sulfate 220 mg x 10 days to promote wound healing.  5) Consider adding thiamine 100 mg daily 2/2 poor PO intake/ malnutrition  6) Obtain weekly wt to track/trend changes  7) Pt is DNR/DNI, however TF is NOT addressed in [facility]. Confirm goals of care regarding nutrition support   Nutrition recommendations to continue upon discharge. Goal to continue to meet >/= 80% ENN via tolerated route. RD to F/U prn for changes to nutrition dc plan. RD will continue to monitor PO intake, labs, hydration, and wt prn.

## 2025-05-18 NOTE — SWALLOW BEDSIDE ASSESSMENT ADULT - SWALLOW EVAL: CRITERIA FOR SKILLED INTERVENTION MET
DO NOT FEEL THAT ACUTE SPEECH PATHOLOGY FOLLOW UP WOULD CHANGE CLINICAL MANAGEMENT/OUTCOME IN HOSPITAL SETTING. PT'S OROPHARYNGEAL SWALLOWING INTEGRITY AND SPEECH-LANGUAGE INTEGRITY ARE FUNCTIONAL/AT BASELINE. NO NEED FOR ST. GIVEN ABOVE, THIS SERVICE WILL NOT ACTIVELY FOLLOW. RECONSULT PRN SHOULD STATUS CHANGE AND CONDITION WARRANT.

## 2025-05-18 NOTE — DIETITIAN INITIAL EVALUATION ADULT - OTHER INFO
89 y/o F with PMH of DM, HTN, HLD, hypothyroidism presents with generalized weakness over the past week. Son at bedside reports pt was evaluated by PCP 6 days ago and started on amoxicillin for presumed UTI. Previously experienced diarrhea earlier in the week, which has since resolved. Over the past 24 hours, pt has had difficulty standing and using her walker, prompting ED evaluation. Admitted for excessive fatigue possible 2/2 recent UTI, vs sacral infected ulcer.  GOC: DNR/DNI.    Visited pt at bed side this AM. Currently on regular diet, waiting to receive breakfast tray upon RD assessment this morning. Denies changes to appetite over the last week, remains fair to good. UBW stated at 103# however states current wt at 100#, is unsure of time frame of wt loss. No recent wt hx to review in chart. Bed scale wt of 92# taken by RD on 5/18/25. Total wt loss of 11#/ 10.67% x ?time frame however likely clinically significant. NFPE reveals moderate muscle/fat wasting. C/w regular diet. Will add Ensure + HP shake daily to optimize nutritional needs (provides 350 kcal, 20g protein/ shake). See other recs below.  89 y/o F with PMH of DM, HTN, HLD, hypothyroidism presents with generalized weakness over the past week. Son at bedside reports pt was evaluated by PCP 6 days ago and started on amoxicillin for presumed UTI. Previously experienced diarrhea earlier in the week, which has since resolved. Over the past 24 hours, pt has had difficulty standing and using her walker, prompting ED evaluation. Admitted for excessive fatigue possible 2/2 recent UTI, vs sacral infected ulcer.  GOC: DNR/DNI.    Visited pt at bed side this AM. Currently on regular diet, waiting to receive breakfast tray upon RD assessment this morning. Denies changes to appetite over the last week, remains fair to good. SLP cristhian completed and rec regular texture w/ thin liquids. UBW stated at 103# however states current wt at 100#, is unsure of time frame of wt loss. No recent wt hx to review in chart. Bed scale wt of 92# taken by RD on 5/18/25. Total wt loss of 11#/ 10.67% x ?time frame however likely clinically significant. NFPE reveals moderate muscle/fat wasting. C/w regular diet. Will add Ensure + HP shake daily to optimize nutritional needs (provides 350 kcal, 20g protein/ shake). See other recs below.

## 2025-05-18 NOTE — SWALLOW BEDSIDE ASSESSMENT ADULT - SWALLOW EVAL: RECOMMENDED DIET
SUGGEST A REGULAR CONSISTENCY DIET WITH THIN LIQUID TEXTURES AS THE PATIENT APPEARS CLINICALLY TOLERANT OF THESE FOOD TEXTURES FROM AN OROPHARYNGEAL SWALLOWING PERSPECTIVE ON EXAM.

## 2025-05-18 NOTE — SWALLOW BEDSIDE ASSESSMENT ADULT - SWALLOW EVAL: PROGNOSIS
2) On encounter, an age acceptable loss of bulk was apparent in pt's strap muscle regions. The pt was alert, interactive and pleasant. She was able to verbalize during communicative probes without evidence of a primary speech-language pathology. Pt is able to verbalize needs and is felt to be at speech-language baseline.

## 2025-05-18 NOTE — CONSULT NOTE ADULT - ASSESSMENT
Assessment:      Antimicrobials:      Impression:     Recommendations:        Clinical team may change from intravenous to oral antibiotics when the following criteria are met:   1. Patient is clinically improving/stable       a)	Improved signs and symptoms of infection from initial presentation       b)	Afebrile for 24 hours       c)	Leukocytosis trending towards normal range   2. Patient is tolerating oral intake   3. Initial/repeat blood cultures are negative OR do not need to wait for preliminary blood cultures to result    When above criteria met, may change iv antibiotics to: agent, dose, frequency, duration.  Cannot advise changing to oral antibiotic therapy until culture sensitivity is available.   Assessment:  89M with DM, HTN, HLD, hypothyroidism presents 5/16 with generalized weakness  Patient denies any fever or chills, abdominal pain, cough, dysuria  Per chart, recently completed amoxicillin for UTI  Afebrile on admission, on RA  WBC 13  Cr 0.59  ESR 53, CRP 28  UA negative  RVP negative  CXR clear    Antimicrobials:  cefTRIAXone Injectable. 1000 every 24 hours  (5/17 --- )  vancomycin  IVPB 750 every 24 hours (5/17 --- )    Impression:   #Sacral Wound, Possible Soft Skin Tissue Infection  #Leukocytosis  #Generalized Weakness    Recommendations:  - continue empiric Vanco 750mg q24 (Day #2)  - please check Vancomycin trough before 4th sequential dose  - increase empiric CTX to 2G q24 (Day #2)  - monitor temperature curve  - trend WBC  - reason for abx use reviewed with patient  - side effects of antibiotic discussed, tolerating abx well so far  - Prior cultures reviewed. An epidemiologic assessment was performed. There is a significant risk for resistant microorganisms to spread to family members, and/or healthcare staff. Isolation precautions based on infection control policy. Will reconsider further isolation measures based on new culture results and other clinical data as appropriate Appropriate cultures collected and an appropriate broad spectrum antibiotic therapy will be considered  - follow up BCx x2  - follow up CTAP  - Wound care, offloading   - rest per primary team    Clinical team may change from intravenous to oral antibiotics when the following criteria are met:   1. Patient is clinically improving/stable       a)	Improved signs and symptoms of infection from initial presentation       b)	Afebrile for 24 hours       c)	Leukocytosis trending towards normal range   2. Patient is tolerating oral intake   3. Initial/repeat blood cultures are negative OR do not need to wait for preliminary blood cultures to result    Cannot advise changing to oral antibiotic therapy until culture sensitivity is available.   Assessment:  89M with DM, HTN, HLD, hypothyroidism presents 5/16 with generalized weakness and rectal pain  Patient denies any fever or chills, abdominal pain, cough, dysuria  Per chart, recently completed amoxicillin for UTI  Afebrile on admission, on RA  WBC 13  Cr 0.59  ESR 53, CRP 28  UA negative  RVP negative  CXR clear    Antimicrobials:  cefTRIAXone Injectable. 1000 every 24 hours  (5/17 --- )  vancomycin  IVPB 750 every 24 hours (5/17 --- )    Impression:   #Sacral Wound, Possible Soft Skin Tissue Infection  #Leukocytosis  #Generalized Weakness    Recommendations:  - continue empiric Vanco 750mg q24 (Day #2)  - please check Vancomycin trough before 4th sequential dose  - increase empiric CTX to 2G q24 (Day #2)  - monitor temperature curve  - trend WBC  - reason for abx use reviewed with patient  - side effects of antibiotic discussed, tolerating abx well so far  - Prior cultures reviewed. An epidemiologic assessment was performed. There is a significant risk for resistant microorganisms to spread to family members, and/or healthcare staff. Isolation precautions based on infection control policy. Will reconsider further isolation measures based on new culture results and other clinical data as appropriate Appropriate cultures collected and an appropriate broad spectrum antibiotic therapy will be considered  - follow up BCx x2  - follow up CTAP  - Wound care, offloading   - rest per primary team    Clinical team may change from intravenous to oral antibiotics when the following criteria are met:   1. Patient is clinically improving/stable       a)	Improved signs and symptoms of infection from initial presentation       b)	Afebrile for 24 hours       c)	Leukocytosis trending towards normal range   2. Patient is tolerating oral intake   3. Initial/repeat blood cultures are negative OR do not need to wait for preliminary blood cultures to result    Cannot advise changing to oral antibiotic therapy until culture sensitivity is available.

## 2025-05-18 NOTE — DIETITIAN INITIAL EVALUATION ADULT - ORAL INTAKE PTA/DIET HISTORY
Lives at home alone, states her son lives nearby to assist w/ ADLs (cooking, grocery shopping, etc). Does not follow specific diet. Likes to consume a variety of different foods. Consumes 3 meals per day. Likely meeting <75% ENN based off diet recall.

## 2025-05-18 NOTE — SWALLOW BEDSIDE ASSESSMENT ADULT - SLP GENERAL OBSERVATIONS
On encounter, an age acceptable loss of bulk was apparent in pt's strap muscle regions. The pt was alert, interactive and pleasant. She was able to verbalize during communicative probes without evidence of a primary speech-language pathology. Pt is able to verbalize needs and is felt to be at speech-language baseline.

## 2025-05-18 NOTE — SWALLOW BEDSIDE ASSESSMENT ADULT - NS SPL SWALLOW CLINIC TRIAL FT
Pt exhibited Oropharyngeal Swallowing integrity which subjectively appeared to be within functional parameters for age. Pt is 89 years old. NO behavioral aspiration signs exhibited. Odynophagia denied. Pt's oropharyngeal swallowing integrity appears to be stable for age/at usual state.

## 2025-05-18 NOTE — CONSULT NOTE ADULT - SUBJECTIVE AND OBJECTIVE BOX
Patient is a 89y old  Female who presents with a chief complaint of   HPI:  89F with PMH of DM, HTN, HLD, hypothyroidism presents with generalized weakness over the past week. Son at bedside reports pt was evaluated by PCP 6 days ago and started on amoxicillin for presumed UTI. Previously experienced diarrhea earlier in the week, which has since resolved. Over the past 24 hours, pt has had difficulty standing and using her walker, prompting ED evaluation. No reported fever, nausea, or vomiting at home. Denies CP, SOB, cough, or abdominal pain. Reports rectal pain, suspected to be secondary to hemorrhoids. (17 May 2025 20:42)  Above HPI reviewed and reconciled with patient\    89M with     · HPI Objective Statement: 89 y/o F with PMH of DM, HTN, HLD, hypothyroid presents with generalized weakness this week. Pt with son at bedside. He states patient was seen by PCP 6 days ago, started on amoxicillin for presumed UTI. He states she did have diarrhea earlier in the week, but that has subsided. States over the past 24 hours she's had difficulty standing and using her walker which prompted ED evaluation. No reported fever, nausea, vomiting at home. Pt denies CP, SOb, cough, abdominal pain. States she has pain in her rectum which she believes is hemorrhoids.    	MORELIA Jay MD, ED Attdg:  Case d/w ED PA.  Pt seen/evaluated in ED.  89 y/o F with PMH of DM, HTN, HLD, hypothyroid; BIB pvt car from home to ED re: 1 week progressively worsening general weakness, past hours unable to perform ADLs (+ lives alone) despite her walker assistance.  Pt c/o pain by rectum, suspicious of hemorrhoid.  Oral Amoxil this week for ? UTI.        ·  Problem: Malaise and fatigue.   ·  Plan: Fatigue possible 2/2 recent UTI, vs sacral infected ulcer.   Outpt Urine C+S this past week: + Enterococcus faciae 40k per HPF, + pansensitive.  Cover with vancomycin and ceftriaxone  Obtain CT abd/pelvis for further eval.   Air fluidized mattress, offload heels, turn q2h, wound care consult.   ID consult.     Problem/Plan - 2:  ·  Problem: Hyperglycemia.   ·  Plan: #DM2 with hyperglycemia  -ISS.     Problem/Plan - 3:  ·  Problem: Hypothyroidism.   ·  Plan: C/w levothyroxine.     Problem/Plan - 4:  ·  Problem: HTN (hypertension).   ·  Plan: C/w her home meds.     Problem/Plan - 5:  ·  Problem: Prolonged QT interv    PAST MEDICAL & SURGICAL HISTORY:  HTN (hypertension)      HLD (hyperlipidemia)      DM (diabetes mellitus)      Thyroid disease        FAMILY HISTORY:  FH: HTN (hypertension)      Social Hx: Denies alcohol, tobacco, recreational drug use    Allergies  No Known Allergies    ANTIMICROBIALS (past 90 days)  MEDICATIONS  (STANDING):  cefTRIAXone Injectable.   1000 milliGRAM(s) IV Push (05-17-25 @ 16:27)    vancomycin  IVPB   250 mL/Hr IV Intermittent (05-17-25 @ 16:29)        ACTIVE ANTIMICROBIALS  cefTRIAXone Injectable. 1000 every 24 hours  (5/17 --- )  vancomycin  IVPB 750 every 24 hours (5/17 --- )    MEDICATIONS  (STANDING):  acetaminophen     Tablet .. 650 once PRN  aluminum hydroxide/magnesium hydroxide/simethicone Suspension 30 every 4 hours PRN  amLODIPine   Tablet 10 daily  dextrose 50% Injectable 25 once  dextrose 50% Injectable 12.5 once  dextrose 50% Injectable 25 once  dextrose Oral Gel 15 once PRN  glucagon  Injectable 1 once  heparin   Injectable 5000 every 12 hours  insulin lispro (ADMELOG) corrective regimen sliding scale  three times a day before meals  insulin lispro (ADMELOG) corrective regimen sliding scale  at bedtime  levothyroxine 25 daily  melatonin 3 at bedtime PRN  metoprolol succinate ER 50 daily  ondansetron Injectable 4 every 8 hours PRN      REVIEW OF SYSTEMS  [  ] ROS unobtainable because:    [ x ] All other systems negative except as noted below    Constitutional:  [ ] fever [ ] chills  [ ] weight loss  [ ]night sweat  [ ]poor appetite/PO intake [ ]fatigue   Skin:  [ ] rash [ ] phlebitis	  Eyes: [ ] icterus [ ] pain  [ ] discharge	  ENMT: [ ] sore throat  [ ] thrush [ ] ulcers [ ] exudates [ ]anosmia  Respiratory: [ ] dyspnea [ ] hemoptysis [ ] cough [ ] sputum	  Cardiovascular:  [ ] chest pain [ ] palpitations [ ] edema	  Gastrointestinal:  [ ] nausea [ ] vomiting [ ] diarrhea [ ] constipation [ ] pain	  Genitourinary:  [ ] dysuria [ ] frequency [ ] hematuria [ ] discharge [ ] flank pain  [ ] incontinence  Musculoskeletal:  [ ] myalgias [ ] arthralgias [ ] arthritis  [ ] back pain  Neurological:  [ ] headache [ ] weakness [ ] seizures  [ ] confusion/altered mental status    Vital Signs Last 24 Hrs  T(C): 36.5 (18 May 2025 07:40), Max: 36.8 (17 May 2025 19:24)  T(F): 97.7 (18 May 2025 07:40), Max: 98.2 (17 May 2025 19:24)  HR: 81 (18 May 2025 07:40) (67 - 102)  BP: 120/54 (18 May 2025 07:40) (120/54 - 145/70)  BP(mean): 88 (17 May 2025 17:00) (67 - 88)  RR: 18 (18 May 2025 07:40) (13 - 20)  SpO2: 95% (18 May 2025 07:40) (92% - 96%)    Parameters below as of 18 May 2025 07:40  Patient On (Oxygen Delivery Method): room air        Physical Exam:  Constitutional:  well preserved, comfortable  Head/Eyes: no icterus  LUNGS:  CTA  CVS:  regular rhythm  Abd:  soft, non-tender; non-distended  Ext:  no edema  Vascular:  IV site no erythema tenderness or discharge  Neuro: AAO X 3, non- focal    Labs: all available labs reviewed                        10.0   10.76 )-----------( 265      ( 18 May 2025 06:57 )             30.9     05-18    139  |  107  |  10  ----------------------------<  104[H]  3.1[L]   |  26  |  0.36[L]    Ca    8.8      18 May 2025 06:57  Phos  2.5     05-18  Mg     2.2     05-18    TPro  6.6  /  Alb  2.8[L]  /  TBili  0.3  /  DBili  x   /  AST  21  /  ALT  14  /  AlkPhos  51  05-18     LIVER FUNCTIONS - ( 18 May 2025 06:57 )  Alb: 2.8 g/dL / Pro: 6.6 gm/dL / ALK PHOS: 51 U/L / ALT: 14 U/L / AST: 21 U/L / GGT: x           Urinalysis Basic - ( 18 May 2025 06:57 )    Color: x / Appearance: x / SG: x / pH: x  Gluc: 104 mg/dL / Ketone: x  / Bili: x / Urobili: x   Blood: x / Protein: x / Nitrite: x   Leuk Esterase: x / RBC: x / WBC x   Sq Epi: x / Non Sq Epi: x / Bacteria: x          Radiology: all available radiological tests reviewed    Advanced directives addressed: full resuscitation Patient is a 89y old  Female who presents with a chief complaint of   HPI:  89F with PMH of DM, HTN, HLD, hypothyroidism presents with generalized weakness over the past week. Son at bedside reports pt was evaluated by PCP 6 days ago and started on amoxicillin for presumed UTI. Previously experienced diarrhea earlier in the week, which has since resolved. Over the past 24 hours, pt has had difficulty standing and using her walker, prompting ED evaluation. No reported fever, nausea, or vomiting at home. Denies CP, SOB, cough, or abdominal pain. Reports rectal pain, suspected to be secondary to hemorrhoids. (17 May 2025 20:42)  Above HPI reviewed and reconciled with patient    89M with DM, HTN, HLD, hypothyroidism presents 5/16 with generalized weakness  Patient denies any fever or chills, abdominal pain, cough, dysuria  Per chart, recently completed amoxicillin for UTI  Afebrile on admission, on RA  WBC 13  Cr 0.59  ESR 53, CRP 28  UA negative  RVP negative  CXR clear    PAST MEDICAL & SURGICAL HISTORY:  HTN (hypertension)      HLD (hyperlipidemia)      DM (diabetes mellitus)      Thyroid disease        FAMILY HISTORY:  FH: HTN (hypertension)      Social Hx: Denies alcohol, tobacco, recreational drug use    Allergies  No Known Allergies    ANTIMICROBIALS (past 90 days)  MEDICATIONS  (STANDING):  cefTRIAXone Injectable.   1000 milliGRAM(s) IV Push (05-17-25 @ 16:27)    vancomycin  IVPB   250 mL/Hr IV Intermittent (05-17-25 @ 16:29)        ACTIVE ANTIMICROBIALS  cefTRIAXone Injectable. 1000 every 24 hours  (5/17 --- )  vancomycin  IVPB 750 every 24 hours (5/17 --- )    MEDICATIONS  (STANDING):  acetaminophen     Tablet .. 650 once PRN  aluminum hydroxide/magnesium hydroxide/simethicone Suspension 30 every 4 hours PRN  amLODIPine   Tablet 10 daily  dextrose 50% Injectable 25 once  dextrose 50% Injectable 12.5 once  dextrose 50% Injectable 25 once  dextrose Oral Gel 15 once PRN  glucagon  Injectable 1 once  heparin   Injectable 5000 every 12 hours  insulin lispro (ADMELOG) corrective regimen sliding scale  three times a day before meals  insulin lispro (ADMELOG) corrective regimen sliding scale  at bedtime  levothyroxine 25 daily  melatonin 3 at bedtime PRN  metoprolol succinate ER 50 daily  ondansetron Injectable 4 every 8 hours PRN      REVIEW OF SYSTEMS  [  ] ROS unobtainable because:    [ x ] All other systems negative except as noted below    Constitutional:  [ ] fever [ ] chills  [ ] weight loss  [ ]night sweat  [ ]poor appetite/PO intake [ ]fatigue   Skin:  [ ] rash [ ] phlebitis	  Eyes: [ ] icterus [ ] pain  [ ] discharge	  ENMT: [ ] sore throat  [ ] thrush [ ] ulcers [ ] exudates [ ]anosmia  Respiratory: [ ] dyspnea [ ] hemoptysis [ ] cough [ ] sputum	  Cardiovascular:  [ ] chest pain [ ] palpitations [ ] edema	  Gastrointestinal:  [ ] nausea [ ] vomiting [ ] diarrhea [ ] constipation [ ] pain	  Genitourinary:  [ ] dysuria [ ] frequency [ ] hematuria [ ] discharge [ ] flank pain  [ ] incontinence  Musculoskeletal:  [ ] myalgias [ ] arthralgias [ ] arthritis  [ ] back pain  Neurological:  [ ] headache [ ] weakness [ ] seizures  [ ] confusion/altered mental status    Vital Signs Last 24 Hrs  T(C): 36.5 (18 May 2025 07:40), Max: 36.8 (17 May 2025 19:24)  T(F): 97.7 (18 May 2025 07:40), Max: 98.2 (17 May 2025 19:24)  HR: 81 (18 May 2025 07:40) (67 - 102)  BP: 120/54 (18 May 2025 07:40) (120/54 - 145/70)  BP(mean): 88 (17 May 2025 17:00) (67 - 88)  RR: 18 (18 May 2025 07:40) (13 - 20)  SpO2: 95% (18 May 2025 07:40) (92% - 96%)    Parameters below as of 18 May 2025 07:40  Patient On (Oxygen Delivery Method): room air        Physical Exam:  Constitutional:  frail, NAD  Head/Eyes: no icterus  LUNGS:  CTA  CVS:  regular rhythm  Abd:  soft, non-tender; non-distended  Ext:  no edema  Vascular:  IV site no erythema tenderness or discharge  Neuro: AAO X2    Labs: all available labs reviewed                        10.0   10.76 )-----------( 265      ( 18 May 2025 06:57 )             30.9     05-18    139  |  107  |  10  ----------------------------<  104[H]  3.1[L]   |  26  |  0.36[L]    Ca    8.8      18 May 2025 06:57  Phos  2.5     05-18  Mg     2.2     05-18    TPro  6.6  /  Alb  2.8[L]  /  TBili  0.3  /  DBili  x   /  AST  21  /  ALT  14  /  AlkPhos  51  05-18     LIVER FUNCTIONS - ( 18 May 2025 06:57 )  Alb: 2.8 g/dL / Pro: 6.6 gm/dL / ALK PHOS: 51 U/L / ALT: 14 U/L / AST: 21 U/L / GGT: x           Urinalysis Basic - ( 18 May 2025 06:57 )    Color: x / Appearance: x / SG: x / pH: x  Gluc: 104 mg/dL / Ketone: x  / Bili: x / Urobili: x   Blood: x / Protein: x / Nitrite: x   Leuk Esterase: x / RBC: x / WBC x   Sq Epi: x / Non Sq Epi: x / Bacteria: x          Radiology: all available radiological tests reviewed    Advanced directives addressed: full resuscitation Patient is a 89y old  Female who presents with a chief complaint of   HPI:  89F with PMH of DM, HTN, HLD, hypothyroidism presents with generalized weakness over the past week. Son at bedside reports pt was evaluated by PCP 6 days ago and started on amoxicillin for presumed UTI. Previously experienced diarrhea earlier in the week, which has since resolved. Over the past 24 hours, pt has had difficulty standing and using her walker, prompting ED evaluation. No reported fever, nausea, or vomiting at home. Denies CP, SOB, cough, or abdominal pain. Reports rectal pain, suspected to be secondary to hemorrhoids. (17 May 2025 20:42)  Above HPI reviewed and reconciled with patient    89M with DM, HTN, HLD, hypothyroidism presents 5/16 with generalized weakness and rectal pain  Patient denies any fever or chills, abdominal pain, cough, dysuria  Per chart, recently completed amoxicillin for UTI  Afebrile on admission, on RA  WBC 13  Cr 0.59  ESR 53, CRP 28  UA negative  RVP negative  CXR clear    PAST MEDICAL & SURGICAL HISTORY:  HTN (hypertension)      HLD (hyperlipidemia)      DM (diabetes mellitus)      Thyroid disease        FAMILY HISTORY:  FH: HTN (hypertension)      Social Hx: Denies alcohol, tobacco, recreational drug use    Allergies  No Known Allergies    ANTIMICROBIALS (past 90 days)  MEDICATIONS  (STANDING):  cefTRIAXone Injectable.   1000 milliGRAM(s) IV Push (05-17-25 @ 16:27)    vancomycin  IVPB   250 mL/Hr IV Intermittent (05-17-25 @ 16:29)        ACTIVE ANTIMICROBIALS  cefTRIAXone Injectable. 1000 every 24 hours  (5/17 --- )  vancomycin  IVPB 750 every 24 hours (5/17 --- )    MEDICATIONS  (STANDING):  acetaminophen     Tablet .. 650 once PRN  aluminum hydroxide/magnesium hydroxide/simethicone Suspension 30 every 4 hours PRN  amLODIPine   Tablet 10 daily  dextrose 50% Injectable 25 once  dextrose 50% Injectable 12.5 once  dextrose 50% Injectable 25 once  dextrose Oral Gel 15 once PRN  glucagon  Injectable 1 once  heparin   Injectable 5000 every 12 hours  insulin lispro (ADMELOG) corrective regimen sliding scale  three times a day before meals  insulin lispro (ADMELOG) corrective regimen sliding scale  at bedtime  levothyroxine 25 daily  melatonin 3 at bedtime PRN  metoprolol succinate ER 50 daily  ondansetron Injectable 4 every 8 hours PRN      REVIEW OF SYSTEMS  [  ] ROS unobtainable because:    [ x ] All other systems negative except as noted below    Constitutional:  [ ] fever [ ] chills  [ ] weight loss  [ ]night sweat  [ ]poor appetite/PO intake [ ]fatigue   Skin:  [ ] rash [ ] phlebitis	  Eyes: [ ] icterus [ ] pain  [ ] discharge	  ENMT: [ ] sore throat  [ ] thrush [ ] ulcers [ ] exudates [ ]anosmia  Respiratory: [ ] dyspnea [ ] hemoptysis [ ] cough [ ] sputum	  Cardiovascular:  [ ] chest pain [ ] palpitations [ ] edema	  Gastrointestinal:  [ ] nausea [ ] vomiting [ ] diarrhea [ ] constipation [ ] pain	  Genitourinary:  [ ] dysuria [ ] frequency [ ] hematuria [ ] discharge [ ] flank pain  [ ] incontinence  Musculoskeletal:  [ ] myalgias [ ] arthralgias [ ] arthritis  [ ] back pain  Neurological:  [ ] headache [ ] weakness [ ] seizures  [ ] confusion/altered mental status    Vital Signs Last 24 Hrs  T(C): 36.5 (18 May 2025 07:40), Max: 36.8 (17 May 2025 19:24)  T(F): 97.7 (18 May 2025 07:40), Max: 98.2 (17 May 2025 19:24)  HR: 81 (18 May 2025 07:40) (67 - 102)  BP: 120/54 (18 May 2025 07:40) (120/54 - 145/70)  BP(mean): 88 (17 May 2025 17:00) (67 - 88)  RR: 18 (18 May 2025 07:40) (13 - 20)  SpO2: 95% (18 May 2025 07:40) (92% - 96%)    Parameters below as of 18 May 2025 07:40  Patient On (Oxygen Delivery Method): room air        Physical Exam:  Constitutional:  frail, NAD  Head/Eyes: no icterus  LUNGS:  CTA  CVS:  regular rhythm  Abd:  soft, non-tender; non-distended  Ext:  no edema  Vascular:  IV site no erythema tenderness or discharge  Neuro: AAO X3    Labs: all available labs reviewed                        10.0   10.76 )-----------( 265      ( 18 May 2025 06:57 )             30.9     05-18    139  |  107  |  10  ----------------------------<  104[H]  3.1[L]   |  26  |  0.36[L]    Ca    8.8      18 May 2025 06:57  Phos  2.5     05-18  Mg     2.2     05-18    TPro  6.6  /  Alb  2.8[L]  /  TBili  0.3  /  DBili  x   /  AST  21  /  ALT  14  /  AlkPhos  51  05-18     LIVER FUNCTIONS - ( 18 May 2025 06:57 )  Alb: 2.8 g/dL / Pro: 6.6 gm/dL / ALK PHOS: 51 U/L / ALT: 14 U/L / AST: 21 U/L / GGT: x           Urinalysis Basic - ( 18 May 2025 06:57 )    Color: x / Appearance: x / SG: x / pH: x  Gluc: 104 mg/dL / Ketone: x  / Bili: x / Urobili: x   Blood: x / Protein: x / Nitrite: x   Leuk Esterase: x / RBC: x / WBC x   Sq Epi: x / Non Sq Epi: x / Bacteria: x          Radiology: all available radiological tests reviewed    Advanced directives addressed: full resuscitation

## 2025-05-18 NOTE — SWALLOW BEDSIDE ASSESSMENT ADULT - SWALLOW EVAL: DIAGNOSIS
1) Pt exhibits Oropharyngeal Swallowing integrity which subjectively appears to be within functional parameters for age. Pt is 89 years old. NO behavioral aspiration signs exhibited. Odynophagia denied. Pt's oropharyngeal swallowing integrity appears to be stable for age/at usual state.

## 2025-05-18 NOTE — DIETITIAN INITIAL EVALUATION ADULT - PERTINENT MEDS FT
MEDICATIONS  (STANDING):  amLODIPine   Tablet 10 milliGRAM(s) Oral daily  cefTRIAXone Injectable. 2000 milliGRAM(s) IV Push every 24 hours  dextrose 5%. 1000 milliLiter(s) (50 mL/Hr) IV Continuous <Continuous>  dextrose 5%. 1000 milliLiter(s) (100 mL/Hr) IV Continuous <Continuous>  dextrose 50% Injectable 25 Gram(s) IV Push once  dextrose 50% Injectable 12.5 Gram(s) IV Push once  dextrose 50% Injectable 25 Gram(s) IV Push once  glucagon  Injectable 1 milliGRAM(s) IntraMuscular once  heparin   Injectable 5000 Unit(s) SubCutaneous every 12 hours  insulin lispro (ADMELOG) corrective regimen sliding scale   SubCutaneous three times a day before meals  insulin lispro (ADMELOG) corrective regimen sliding scale   SubCutaneous at bedtime  levothyroxine 25 MICROGram(s) Oral daily  metoprolol succinate ER 50 milliGRAM(s) Oral daily  potassium chloride   Powder 40 milliEquivalent(s) Oral every 4 hours  sodium chloride 0.9%. 1000 milliLiter(s) (75 mL/Hr) IV Continuous <Continuous>  vancomycin  IVPB 750 milliGRAM(s) IV Intermittent every 24 hours    MEDICATIONS  (PRN):  acetaminophen     Tablet .. 650 milliGRAM(s) Oral once PRN Mild Pain (1 - 3)  aluminum hydroxide/magnesium hydroxide/simethicone Suspension 30 milliLiter(s) Oral every 4 hours PRN Dyspepsia  dextrose Oral Gel 15 Gram(s) Oral once PRN Blood Glucose LESS THAN 70 milliGRAM(s)/deciliter  melatonin 3 milliGRAM(s) Oral at bedtime PRN Insomnia  ondansetron Injectable 4 milliGRAM(s) IV Push every 8 hours PRN Nausea and/or Vomiting

## 2025-05-18 NOTE — DIETITIAN INITIAL EVALUATION ADULT - PROBLEM SELECTOR PLAN 1
Fatigue possible 2/2 recent UTI, vs sacral infected ulcer.   Outpt Urine C+S this past week: + Enterococcus faciae 40k per HPF, + pansensitive.  Cover with vancomycin and ceftriaxone  Obtain CT abd/pelvis for further eval.   Air fluidized mattress, offload heels, turn q2h, wound care consult.   ID consult.

## 2025-05-19 LAB
ANION GAP SERPL CALC-SCNC: 2 MMOL/L — LOW (ref 5–17)
BASOPHILS # BLD AUTO: 0.06 K/UL — SIGNIFICANT CHANGE UP (ref 0–0.2)
BASOPHILS NFR BLD AUTO: 0.6 % — SIGNIFICANT CHANGE UP (ref 0–2)
BUN SERPL-MCNC: 17 MG/DL — SIGNIFICANT CHANGE UP (ref 7–23)
CALCIUM SERPL-MCNC: 8.9 MG/DL — SIGNIFICANT CHANGE UP (ref 8.5–10.1)
CHLORIDE SERPL-SCNC: 109 MMOL/L — HIGH (ref 96–108)
CO2 SERPL-SCNC: 27 MMOL/L — SIGNIFICANT CHANGE UP (ref 22–31)
CREAT SERPL-MCNC: 0.48 MG/DL — LOW (ref 0.5–1.3)
EGFR: 86 ML/MIN/1.73M2 — SIGNIFICANT CHANGE UP
EGFR: 86 ML/MIN/1.73M2 — SIGNIFICANT CHANGE UP
EOSINOPHIL # BLD AUTO: 0.23 K/UL — SIGNIFICANT CHANGE UP (ref 0–0.5)
EOSINOPHIL NFR BLD AUTO: 2.3 % — SIGNIFICANT CHANGE UP (ref 0–6)
GLUCOSE SERPL-MCNC: 150 MG/DL — HIGH (ref 70–99)
HCT VFR BLD CALC: 29.3 % — LOW (ref 34.5–45)
HGB BLD-MCNC: 9.5 G/DL — LOW (ref 11.5–15.5)
IMM GRANULOCYTES # BLD AUTO: 0.03 K/UL — SIGNIFICANT CHANGE UP (ref 0–0.07)
IMM GRANULOCYTES NFR BLD AUTO: 0.3 % — SIGNIFICANT CHANGE UP (ref 0–0.9)
LYMPHOCYTES # BLD AUTO: 1.65 K/UL — SIGNIFICANT CHANGE UP (ref 1–3.3)
LYMPHOCYTES NFR BLD AUTO: 16.8 % — SIGNIFICANT CHANGE UP (ref 13–44)
MAGNESIUM SERPL-MCNC: 2 MG/DL — SIGNIFICANT CHANGE UP (ref 1.6–2.6)
MCHC RBC-ENTMCNC: 29.9 PG — SIGNIFICANT CHANGE UP (ref 27–34)
MCHC RBC-ENTMCNC: 32.4 G/DL — SIGNIFICANT CHANGE UP (ref 32–36)
MCV RBC AUTO: 92.1 FL — SIGNIFICANT CHANGE UP (ref 80–100)
MONOCYTES # BLD AUTO: 0.78 K/UL — SIGNIFICANT CHANGE UP (ref 0–0.9)
MONOCYTES NFR BLD AUTO: 8 % — SIGNIFICANT CHANGE UP (ref 2–14)
NEUTROPHILS # BLD AUTO: 7.06 K/UL — SIGNIFICANT CHANGE UP (ref 1.8–7.4)
NEUTROPHILS NFR BLD AUTO: 72 % — SIGNIFICANT CHANGE UP (ref 43–77)
NRBC # BLD AUTO: 0 K/UL — SIGNIFICANT CHANGE UP (ref 0–0)
NRBC # FLD: 0 K/UL — SIGNIFICANT CHANGE UP (ref 0–0)
NRBC BLD AUTO-RTO: 0 /100 WBCS — SIGNIFICANT CHANGE UP (ref 0–0)
PLATELET # BLD AUTO: 260 K/UL — SIGNIFICANT CHANGE UP (ref 150–400)
PMV BLD: 10.1 FL — SIGNIFICANT CHANGE UP (ref 7–13)
POTASSIUM SERPL-MCNC: 4.4 MMOL/L — SIGNIFICANT CHANGE UP (ref 3.5–5.3)
POTASSIUM SERPL-SCNC: 4.4 MMOL/L — SIGNIFICANT CHANGE UP (ref 3.5–5.3)
RBC # BLD: 3.18 M/UL — LOW (ref 3.8–5.2)
RBC # FLD: 12.7 % — SIGNIFICANT CHANGE UP (ref 10.3–14.5)
SODIUM SERPL-SCNC: 138 MMOL/L — SIGNIFICANT CHANGE UP (ref 135–145)
WBC # BLD: 9.81 K/UL — SIGNIFICANT CHANGE UP (ref 3.8–10.5)
WBC # FLD AUTO: 9.81 K/UL — SIGNIFICANT CHANGE UP (ref 3.8–10.5)

## 2025-05-19 PROCEDURE — 99233 SBSQ HOSP IP/OBS HIGH 50: CPT

## 2025-05-19 RX ORDER — MAGNESIUM CITRATE
296 SOLUTION, ORAL ORAL ONCE
Refills: 0 | Status: COMPLETED | OUTPATIENT
Start: 2025-05-19 | End: 2025-05-19

## 2025-05-19 RX ORDER — MINERAL OIL
133 OIL (ML) MISCELLANEOUS ONCE
Refills: 0 | Status: COMPLETED | OUTPATIENT
Start: 2025-05-19 | End: 2025-05-19

## 2025-05-19 RX ORDER — PIPERACILLIN-TAZO-DEXTROSE,ISO 3.375G/5
3.38 IV SOLUTION, PIGGYBACK PREMIX FROZEN(ML) INTRAVENOUS EVERY 8 HOURS
Refills: 0 | Status: DISCONTINUED | OUTPATIENT
Start: 2025-05-20 | End: 2025-05-21

## 2025-05-19 RX ORDER — POLYETHYLENE GLYCOL 3350 17 G/17G
17 POWDER, FOR SOLUTION ORAL
Refills: 0 | Status: COMPLETED | OUTPATIENT
Start: 2025-05-19 | End: 2025-05-21

## 2025-05-19 RX ORDER — ACETAMINOPHEN 500 MG/5ML
650 LIQUID (ML) ORAL ONCE
Refills: 0 | Status: COMPLETED | OUTPATIENT
Start: 2025-05-19 | End: 2025-05-19

## 2025-05-19 RX ORDER — BISACODYL 5 MG
10 TABLET, DELAYED RELEASE (ENTERIC COATED) ORAL ONCE
Refills: 0 | Status: COMPLETED | OUTPATIENT
Start: 2025-05-19 | End: 2025-05-19

## 2025-05-19 RX ORDER — PIPERACILLIN-TAZO-DEXTROSE,ISO 3.375G/5
3.38 IV SOLUTION, PIGGYBACK PREMIX FROZEN(ML) INTRAVENOUS ONCE
Refills: 0 | Status: COMPLETED | OUTPATIENT
Start: 2025-05-19 | End: 2025-05-19

## 2025-05-19 RX ORDER — DOXYCYCLINE HYCLATE 100 MG
100 TABLET ORAL EVERY 12 HOURS
Refills: 0 | Status: DISCONTINUED | OUTPATIENT
Start: 2025-05-19 | End: 2025-05-19

## 2025-05-19 RX ADMIN — POLYETHYLENE GLYCOL 3350 17 GRAM(S): 17 POWDER, FOR SOLUTION ORAL at 22:30

## 2025-05-19 RX ADMIN — Medication 260 MILLIGRAM(S): at 14:06

## 2025-05-19 RX ADMIN — METOPROLOL SUCCINATE 50 MILLIGRAM(S): 50 TABLET, EXTENDED RELEASE ORAL at 09:54

## 2025-05-19 RX ADMIN — HEPARIN SODIUM 5000 UNIT(S): 1000 INJECTION INTRAVENOUS; SUBCUTANEOUS at 09:55

## 2025-05-19 RX ADMIN — HEPARIN SODIUM 5000 UNIT(S): 1000 INJECTION INTRAVENOUS; SUBCUTANEOUS at 22:31

## 2025-05-19 RX ADMIN — Medication 25 MICROGRAM(S): at 05:43

## 2025-05-19 RX ADMIN — Medication 10 MILLIGRAM(S): at 14:11

## 2025-05-19 RX ADMIN — AMLODIPINE BESYLATE 10 MILLIGRAM(S): 10 TABLET ORAL at 09:54

## 2025-05-19 RX ADMIN — INSULIN LISPRO 2: 100 INJECTION, SOLUTION INTRAVENOUS; SUBCUTANEOUS at 08:13

## 2025-05-19 RX ADMIN — Medication 133 MILLILITER(S): at 16:49

## 2025-05-19 RX ADMIN — Medication 650 MILLIGRAM(S): at 15:12

## 2025-05-19 RX ADMIN — Medication 25 GRAM(S): at 22:29

## 2025-05-19 RX ADMIN — INSULIN LISPRO 10: 100 INJECTION, SOLUTION INTRAVENOUS; SUBCUTANEOUS at 12:16

## 2025-05-19 RX ADMIN — Medication 296 MILLILITER(S): at 16:49

## 2025-05-19 RX ADMIN — Medication 200 GRAM(S): at 18:35

## 2025-05-19 RX ADMIN — INSULIN LISPRO 4: 100 INJECTION, SOLUTION INTRAVENOUS; SUBCUTANEOUS at 16:49

## 2025-05-19 NOTE — PHYSICAL THERAPY INITIAL EVALUATION ADULT - ADDITIONAL COMMENTS
Patient states she lives in apartment alone and utilized a RW for ambulation. Son resided nearby and visited near daily to assist.

## 2025-05-19 NOTE — PHYSICAL THERAPY INITIAL EVALUATION ADULT - GENERAL OBSERVATIONS, REHAB EVAL
The Pt was received on 5S in bed supine pleasant, calm, cooperative, and willing to participate with PT. Patient responded well to functional mobility trng, ambulation tx, and thera ex review. Required MIN A x 1 for bed mobility and sit to stand with RW support. MOD A x 1 to side step along edge of bed 3 steps. Unable to tolerate further. Assisted into bedside chair and adjusted for comfort, +alarm. The Pt was in NAD at end of tx.  Call bell, tray, and phone in place and within reach. NSG made aware.

## 2025-05-19 NOTE — PHYSICAL THERAPY INITIAL EVALUATION ADULT - PERTINENT HX OF CURRENT PROBLEM, REHAB EVAL
89F with PMH of DM, HTN, HLD, hypothyroidism presents with generalized weakness over the past week. Son at bedside reports pt was evaluated by PCP 6 days ago and started on amoxicillin for presumed UTI. Previously experienced diarrhea earlier in the week, which has since resolved. Over the past 24 hours, pt has had difficulty standing and using her walker, prompting ED evaluation.

## 2025-05-20 PROCEDURE — 99233 SBSQ HOSP IP/OBS HIGH 50: CPT

## 2025-05-20 RX ORDER — BISACODYL 5 MG
10 TABLET, DELAYED RELEASE (ENTERIC COATED) ORAL ONCE
Refills: 0 | Status: COMPLETED | OUTPATIENT
Start: 2025-05-20 | End: 2025-05-20

## 2025-05-20 RX ORDER — INSULIN GLARGINE-YFGN 100 [IU]/ML
8 INJECTION, SOLUTION SUBCUTANEOUS AT BEDTIME
Refills: 0 | Status: DISCONTINUED | OUTPATIENT
Start: 2025-05-20 | End: 2025-05-20

## 2025-05-20 RX ORDER — MAGNESIUM CITRATE
296 SOLUTION, ORAL ORAL ONCE
Refills: 0 | Status: COMPLETED | OUTPATIENT
Start: 2025-05-20 | End: 2025-05-20

## 2025-05-20 RX ORDER — INSULIN GLARGINE-YFGN 100 [IU]/ML
5 INJECTION, SOLUTION SUBCUTANEOUS AT BEDTIME
Refills: 0 | Status: DISCONTINUED | OUTPATIENT
Start: 2025-05-20 | End: 2025-05-21

## 2025-05-20 RX ORDER — INSULIN LISPRO 100 U/ML
2 INJECTION, SOLUTION INTRAVENOUS; SUBCUTANEOUS
Refills: 0 | Status: DISCONTINUED | OUTPATIENT
Start: 2025-05-20 | End: 2025-05-21

## 2025-05-20 RX ADMIN — METOPROLOL SUCCINATE 50 MILLIGRAM(S): 50 TABLET, EXTENDED RELEASE ORAL at 10:47

## 2025-05-20 RX ADMIN — INSULIN LISPRO 2 UNIT(S): 100 INJECTION, SOLUTION INTRAVENOUS; SUBCUTANEOUS at 17:32

## 2025-05-20 RX ADMIN — AMLODIPINE BESYLATE 10 MILLIGRAM(S): 10 TABLET ORAL at 10:47

## 2025-05-20 RX ADMIN — Medication 25 GRAM(S): at 21:59

## 2025-05-20 RX ADMIN — Medication 25 GRAM(S): at 14:35

## 2025-05-20 RX ADMIN — INSULIN GLARGINE-YFGN 5 UNIT(S): 100 INJECTION, SOLUTION SUBCUTANEOUS at 22:00

## 2025-05-20 RX ADMIN — Medication 25 MICROGRAM(S): at 05:57

## 2025-05-20 RX ADMIN — HEPARIN SODIUM 5000 UNIT(S): 1000 INJECTION INTRAVENOUS; SUBCUTANEOUS at 10:46

## 2025-05-20 RX ADMIN — INSULIN LISPRO 2: 100 INJECTION, SOLUTION INTRAVENOUS; SUBCUTANEOUS at 09:44

## 2025-05-20 RX ADMIN — POLYETHYLENE GLYCOL 3350 17 GRAM(S): 17 POWDER, FOR SOLUTION ORAL at 21:59

## 2025-05-20 RX ADMIN — INSULIN LISPRO 6: 100 INJECTION, SOLUTION INTRAVENOUS; SUBCUTANEOUS at 12:14

## 2025-05-20 RX ADMIN — HEPARIN SODIUM 5000 UNIT(S): 1000 INJECTION INTRAVENOUS; SUBCUTANEOUS at 21:58

## 2025-05-20 RX ADMIN — Medication 25 GRAM(S): at 05:57

## 2025-05-20 RX ADMIN — POLYETHYLENE GLYCOL 3350 17 GRAM(S): 17 POWDER, FOR SOLUTION ORAL at 10:47

## 2025-05-20 NOTE — PROGRESS NOTE ADULT - SUBJECTIVE AND OBJECTIVE BOX
HPI: 89F with PMH of DM, HTN, HLD, hypothyroidism presents with generalized weakness over the past week. Son at bedside reports pt was evaluated by PCP 6 days ago and started on amoxicillin for presumed UTI. Previously experienced diarrhea earlier in the week, which has since resolved. Over the past 24 hours, pt has had difficulty standing and using her walker, prompting ED evaluation. No reported fever, nausea, or vomiting at home. Denies CP, SOB, cough, or abdominal pain. Reports rectal pain, suspected to be secondary to hemorrhoids. (17 May 2025 20:42)    Subjective: pt seen this AM, c/o rectal pain, pt is severely constipated    REVIEW OF SYSTEMS:    CONSTITUTIONAL: No weakness, fevers or chills  EYES/ENT: No visual changes;  No vertigo or throat pain   NECK: No pain or stiffness  RESPIRATORY: No cough, wheezing, hemoptysis; No shortness of breath  CARDIOVASCULAR: No chest pain or palpitations  GASTROINTESTINAL: No abdominal or epigastric pain. No nausea, vomiting, or hematemesis; No diarrhea or constipation. No melena or hematochezia.  GENITOURINARY: No dysuria, frequency or hematuria  NEUROLOGICAL: No numbness or weakness  SKIN: No itching, rashes      MEDICATIONS  (STANDING):  amLODIPine   Tablet 10 milliGRAM(s) Oral daily  cefTRIAXone Injectable. 2000 milliGRAM(s) IV Push every 24 hours  dextrose 5%. 1000 milliLiter(s) (50 mL/Hr) IV Continuous <Continuous>  dextrose 5%. 1000 milliLiter(s) (100 mL/Hr) IV Continuous <Continuous>  dextrose 50% Injectable 25 Gram(s) IV Push once  dextrose 50% Injectable 12.5 Gram(s) IV Push once  dextrose 50% Injectable 25 Gram(s) IV Push once  glucagon  Injectable 1 milliGRAM(s) IntraMuscular once  heparin   Injectable 5000 Unit(s) SubCutaneous every 12 hours  insulin lispro (ADMELOG) corrective regimen sliding scale   SubCutaneous three times a day before meals  insulin lispro (ADMELOG) corrective regimen sliding scale   SubCutaneous at bedtime  levothyroxine 25 MICROGram(s) Oral daily  metoprolol succinate ER 50 milliGRAM(s) Oral daily  potassium chloride   Powder 40 milliEquivalent(s) Oral every 4 hours  sodium chloride 0.9%. 1000 milliLiter(s) (75 mL/Hr) IV Continuous <Continuous>  vancomycin  IVPB 750 milliGRAM(s) IV Intermittent every 24 hours    MEDICATIONS  (PRN):  acetaminophen     Tablet .. 650 milliGRAM(s) Oral once PRN Mild Pain (1 - 3)  aluminum hydroxide/magnesium hydroxide/simethicone Suspension 30 milliLiter(s) Oral every 4 hours PRN Dyspepsia  dextrose Oral Gel 15 Gram(s) Oral once PRN Blood Glucose LESS THAN 70 milliGRAM(s)/deciliter  melatonin 3 milliGRAM(s) Oral at bedtime PRN Insomnia  ondansetron Injectable 4 milliGRAM(s) IV Push every 8 hours PRN Nausea and/or Vomiting    Vital Signs Last 24 Hrs  T(C): 36.4 (19 May 2025 15:37), Max: 36.9 (19 May 2025 07:30)  T(F): 97.5 (19 May 2025 15:37), Max: 98.5 (19 May 2025 07:30)  HR: 78 (19 May 2025 15:37) (78 - 100)  BP: 108/54 (19 May 2025 15:37) (108/54 - 120/62)  BP(mean): 83 (19 May 2025 00:00) (83 - 83)  RR: 18 (19 May 2025 15:37) (16 - 18)  SpO2: 94% (19 May 2025 15:37) (91% - 95%)    Parameters below as of 19 May 2025 15:37  Patient On (Oxygen Delivery Method): room air    PHYSICAL EXAM:  GENERAL: NAD, lying in bed comfortably  HEAD:  Atraumatic, Normocephalic  EYES: conjunctiva and sclera clear  ENT: Moist mucous membranes  NECK: Supple, No JVD  CHEST/LUNG: Clear to auscultation bilaterally; No rales, rhonchi, wheezing. Unlabored respirations  HEART: Regular rate and rhythm; No murmurs  ABDOMEN: Bowel sounds present; Soft, Nontender, Nondistended.   EXTREMITIES:  2+ Peripheral Pulses, brisk capillary refill. No clubbing, cyanosis, or edema  NERVOUS SYSTEM:  Alert & Oriented X3, speech clear. No deficits   MSK: FROM all 4 extremities, full and equal strength  SKIN: sacral wound, closed, non tender     LABS:                          9.5    9.81  )-----------( 260      ( 19 May 2025 06:10 )             29.3   05-19    138  |  109[H]  |  17  ----------------------------<  150[H]  4.4   |  27  |  0.48[L]    Ca    8.9      19 May 2025 06:10  Phos  2.5     05-18  Mg     2.0     05-19    TPro  6.6  /  Alb  2.8[L]  /  TBili  0.3  /  DBili  x   /  AST  21  /  ALT  14  /  AlkPhos  51  05-18    CAPILLARY BLOOD GLUCOSE      POCT Blood Glucose.: 227 mg/dL (19 May 2025 16:48)  POCT Blood Glucose.: 385 mg/dL (19 May 2025 12:09)  POCT Blood Glucose.: 158 mg/dL (19 May 2025 08:09)  POCT Blood Glucose.: 181 mg/dL (18 May 2025 21:56)  POCT Blood Glucose.: 276 mg/dL (18 May 2025 17:13)                  RADIOLOGY:      < from: CT Abdomen and Pelvis w/ IV Cont (05.18.25 @ 11:01) >    IMPRESSION:  Stercoralproctitis.      
Date of Service:05-19-25 @ 10:05  Interval History/ROS: Afebrile overnight, comfortable on RA, no leukocytosis  Reports not able to sleeping well last night, but denies any fever or chills  BCx negative    REVIEW OF SYSTEMS  [  ] ROS unobtainable because:    [ x ] All other systems negative except as noted below    Constitutional:  [ ] fever [ ] chills  [ ] weight loss  [ ]night sweat  [ ]poor appetite/PO intake [ ]fatigue   Skin:  [ ] rash [ ] phlebitis	  Eyes: [ ] icterus [ ] pain  [ ] discharge	  ENMT: [ ] sore throat  [ ] thrush [ ] ulcers [ ] exudates [ ]anosmia  Respiratory: [ ] dyspnea [ ] hemoptysis [ ] cough [ ] sputum	  Cardiovascular:  [ ] chest pain [ ] palpitations [ ] edema	  Gastrointestinal:  [ ] nausea [ ] vomiting [ ] diarrhea [ ] constipation [ ] pain	  Genitourinary:  [ ] dysuria [ ] frequency [ ] hematuria [ ] discharge [ ] flank pain  [ ] incontinence  Musculoskeletal:  [ ] myalgias [ ] arthralgias [ ] arthritis  [ ] back pain  Neurological:  [ ] headache [ ] weakness [ ] seizures  [ ] confusion/altered mental status    Allergies  No Known Allergies        ANTIMICROBIALS:    cefTRIAXone Injectable. 2000 every 24 hours  vancomycin  IVPB 750 every 24 hours        OTHER MEDS: MEDICATIONS  (STANDING):  aluminum hydroxide/magnesium hydroxide/simethicone Suspension 30 every 4 hours PRN  amLODIPine   Tablet 10 daily  dextrose 50% Injectable 25 once  dextrose 50% Injectable 12.5 once  dextrose 50% Injectable 25 once  dextrose Oral Gel 15 once PRN  glucagon  Injectable 1 once  heparin   Injectable 5000 every 12 hours  insulin lispro (ADMELOG) corrective regimen sliding scale  three times a day before meals  insulin lispro (ADMELOG) corrective regimen sliding scale  at bedtime  levothyroxine 25 daily  melatonin 3 at bedtime PRN  metoprolol succinate ER 50 daily  ondansetron Injectable 4 every 8 hours PRN  traMADol 25 every 8 hours PRN      Vital Signs Last 24 Hrs  T(F): 98.5 (05-19-25 @ 07:30), Max: 98.5 (05-19-25 @ 07:30)    Vital Signs Last 24 Hrs  HR: 100 (05-19-25 @ 07:30) (81 - 100)  BP: 120/62 (05-19-25 @ 07:30) (118/67 - 126/54)  RR: 18 (05-19-25 @ 07:30)  SpO2: 95% (05-19-25 @ 07:30) (91% - 95%)  Wt(kg): --    EXAM:    Constitutional:  frail, NAD  Head/Eyes: no icterus  LUNGS:  CTA  CVS:  regular rhythm  Abd:  soft, non-tender; non-distended  Ext:  no edema  Vascular:  IV site no erythema tenderness or discharge  Neuro: AAO X3    Labs:                        9.5    9.81  )-----------( 260      ( 19 May 2025 06:10 )             29.3     05-19    138  |  109[H]  |  17  ----------------------------<  150[H]  4.4   |  27  |  0.48[L]    Ca    8.9      19 May 2025 06:10  Phos  2.5     05-18  Mg     2.0     05-19    TPro  6.6  /  Alb  2.8[L]  /  TBili  0.3  /  DBili  x   /  AST  21  /  ALT  14  /  AlkPhos  51  05-18      WBC Trend:  WBC Count: 9.81 (05-19-25 @ 06:10)  WBC Count: 10.76 (05-18-25 @ 06:57)  WBC Count: 13.65 (05-17-25 @ 13:40)      Creatine Trend:  Creatinine: 0.48 (05-19)  Creatinine: 0.36 (05-18)  Creatinine: 0.59 (05-17)      Liver Biochemical Testing Trend:  Alanine Aminotransferase (ALT/SGPT): 14 (05-18)  Alanine Aminotransferase (ALT/SGPT): 17 (05-17)  Aspartate Aminotransferase (AST/SGOT): 21 (05-18-25 @ 06:57)  Aspartate Aminotransferase (AST/SGOT): 15 (05-17-25 @ 13:40)  Bilirubin Total: 0.3 (05-18)  Bilirubin Total: 0.3 (05-17)      Trend LDH      Urinalysis Basic - ( 19 May 2025 06:10 )    Color: x / Appearance: x / SG: x / pH: x  Gluc: 150 mg/dL / Ketone: x  / Bili: x / Urobili: x   Blood: x / Protein: x / Nitrite: x   Leuk Esterase: x / RBC: x / WBC x   Sq Epi: x / Non Sq Epi: x / Bacteria: x        MICROBIOLOGY:        Urinalysis with Rflx Culture (collected 17 May 2025 15:09)    Culture - Blood (collected 17 May 2025 13:40)  Source: Blood None  Preliminary Report:    No growth at 24 hours    Culture - Blood (collected 17 May 2025 13:40)  Source: Blood None  Preliminary Report:    No growth at 24 hours        Procalcitonin: 0.05 (05-18)    C-Reactive Protein: 28.1 (05-18)    Lactate, Blood: 0.7 (05-17 @ 13:40)    Sedimentation Rate, Erythrocyte: 53 mm/hr (05-18-25 @ 06:57)      RADIOLOGY:  imaging below personally reviewed    Xray Chest 1 View- PORTABLE-Urgent:  (17 May 2025 13:45)      CT Abdomen and Pelvis w/ IV Cont:   ACC: [id number] EXAM:  CT ABDOMEN AND PELVIS IC   ORDERED BY: JADE SHULTZ     PROCEDURE DATE:  05/18/2025          INTERPRETATION:  CLINICAL INFORMATION: Fever    COMPARISON: None.    CONTRAST/COMPLICATIONS:  IV Contrast: Omnipaque 350  90 cc administered   0 cc discarded  Oral Contrast: NONE  .    PROCEDURE:  CT of the Abdomen and Pelvis was performed.  Sagittal and coronal reformats were performed.    FINDINGS:  LOWER CHEST: Cardiomegaly. Coronary artery calcifications. Trace   bilateral pleural effusions and trace bibasilar dependent atelectasis.    LIVER: Within normal limits.  BILE DUCTS: Normal caliber.  GALLBLADDER: Within normal limits.  SPLEEN: Within normal limits.  PANCREAS: Multiple pancreatic cysts, for example an 8 mm cystic lesion in   the uncinate process, 8 mm lesion in the pancreatic head, 9 mm lesion in   the tail. There is no main duct dilatation..  ADRENALS: Within normal limits.  KIDNEYS/URETERS: Right renal cysts. Subcentimeter hypodense left renal   lesions are too small to characterize. Symmetric renal enhancement   without hydronephrosis.    BLADDER: Within normal limits.  REPRODUCTIVE ORGANS: Hysterectomy.    BOWEL: Duodenal diverticulum. Rectal distention with stool. Extensive   perirectal edema. No bowel obstruction. Appendix is normal.  PERITONEUM/RETROPERITONEUM: Within normal limits.  VESSELS: Atherosclerotic changes.  LYMPH NODES: No lymphadenopathy.  ABDOMINAL WALL: Within normal limits.  BONES: Degenerative changes.    IMPRESSION:  Stercoralproctitis.        --- End of Report ---            ALEX NEFF MD; Attending Radiologist  This document has been electronically signed. May 18 2025  2:46PM (05-18-25 @ 11:01)      
HPI: 89F with PMH of DM, HTN, HLD, hypothyroidism presents with generalized weakness over the past week. Son at bedside reports pt was evaluated by PCP 6 days ago and started on amoxicillin for presumed UTI. Previously experienced diarrhea earlier in the week, which has since resolved. Over the past 24 hours, pt has had difficulty standing and using her walker, prompting ED evaluation. No reported fever, nausea, or vomiting at home. Denies CP, SOB, cough, or abdominal pain. Reports rectal pain, suspected to be secondary to hemorrhoids. (17 May 2025 20:42)    Subjective: pt seen this AM, awake, alert, feels well, tolerating orally, no N/V or abdominal pain, small BM overnight       REVIEW OF SYSTEMS:    CONSTITUTIONAL: No weakness, fevers or chills  EYES/ENT: No visual changes;  No vertigo or throat pain   NECK: No pain or stiffness  RESPIRATORY: No cough, wheezing, hemoptysis; No shortness of breath  CARDIOVASCULAR: No chest pain or palpitations  GASTROINTESTINAL: No abdominal or epigastric pain. No nausea, vomiting, or hematemesis; No diarrhea, +constipation. No melena or hematochezia.  GENITOURINARY: No dysuria, frequency or hematuria  NEUROLOGICAL: No numbness or weakness  SKIN: No itching, rashes      MEDICATIONS  (STANDING):  amLODIPine   Tablet 10 milliGRAM(s) Oral daily  cefTRIAXone Injectable. 2000 milliGRAM(s) IV Push every 24 hours  dextrose 5%. 1000 milliLiter(s) (50 mL/Hr) IV Continuous <Continuous>  dextrose 5%. 1000 milliLiter(s) (100 mL/Hr) IV Continuous <Continuous>  dextrose 50% Injectable 25 Gram(s) IV Push once  dextrose 50% Injectable 12.5 Gram(s) IV Push once  dextrose 50% Injectable 25 Gram(s) IV Push once  glucagon  Injectable 1 milliGRAM(s) IntraMuscular once  heparin   Injectable 5000 Unit(s) SubCutaneous every 12 hours  insulin lispro (ADMELOG) corrective regimen sliding scale   SubCutaneous three times a day before meals  insulin lispro (ADMELOG) corrective regimen sliding scale   SubCutaneous at bedtime  levothyroxine 25 MICROGram(s) Oral daily  metoprolol succinate ER 50 milliGRAM(s) Oral daily  potassium chloride   Powder 40 milliEquivalent(s) Oral every 4 hours  sodium chloride 0.9%. 1000 milliLiter(s) (75 mL/Hr) IV Continuous <Continuous>  vancomycin  IVPB 750 milliGRAM(s) IV Intermittent every 24 hours    MEDICATIONS  (PRN):  acetaminophen     Tablet .. 650 milliGRAM(s) Oral once PRN Mild Pain (1 - 3)  aluminum hydroxide/magnesium hydroxide/simethicone Suspension 30 milliLiter(s) Oral every 4 hours PRN Dyspepsia  dextrose Oral Gel 15 Gram(s) Oral once PRN Blood Glucose LESS THAN 70 milliGRAM(s)/deciliter  melatonin 3 milliGRAM(s) Oral at bedtime PRN Insomnia  ondansetron Injectable 4 milliGRAM(s) IV Push every 8 hours PRN Nausea and/or Vomiting    Vital Signs Last 24 Hrs  T(C): 36.8 (20 May 2025 08:41), Max: 36.8 (20 May 2025 08:41)  T(F): 98.2 (20 May 2025 08:41), Max: 98.2 (20 May 2025 08:41)  HR: 84 (20 May 2025 08:41) (80 - 84)  BP: 121/72 (20 May 2025 08:41) (121/72 - 123/65)  RR: 18 (20 May 2025 08:41) (17 - 18)  SpO2: 96% (20 May 2025 08:41) (93% - 96%)    Parameters below as of 20 May 2025 08:41  Patient On (Oxygen Delivery Method): room air    PHYSICAL EXAM:  GENERAL: NAD, lying in bed comfortably  HEAD:  Atraumatic, Normocephalic  EYES: conjunctiva and sclera clear  ENT: Moist mucous membranes  NECK: Supple, No JVD  CHEST/LUNG: Clear to auscultation bilaterally; No rales, rhonchi, wheezing. Unlabored respirations  HEART: Regular rate and rhythm; No murmurs  ABDOMEN: Bowel sounds present; Soft, Nontender, Nondistended.   EXTREMITIES:  2+ Peripheral Pulses, brisk capillary refill. No clubbing, cyanosis, or edema  NERVOUS SYSTEM:  Alert & Oriented X3, speech clear. No deficits   MSK: FROM all 4 extremities, full and equal strength  SKIN: sacral wound, closed, non tender     LABS:                                   9.5    9.81  )-----------( 260      ( 19 May 2025 06:10 )             29.3   05-19    138  |  109[H]  |  17  ----------------------------<  150[H]  4.4   |  27  |  0.48[L]    Ca    8.9      19 May 2025 06:10  Mg     2.0     05-19                    CAPILLARY BLOOD GLUCOSE      POCT Blood Glucose.: 254 mg/dL (20 May 2025 12:10)  POCT Blood Glucose.: 169 mg/dL (20 May 2025 09:00)  POCT Blood Glucose.: 123 mg/dL (19 May 2025 22:00)  POCT Blood Glucose.: 227 mg/dL (19 May 2025 16:48)    RADIOLOGY:      < from: CT Abdomen and Pelvis w/ IV Cont (05.18.25 @ 11:01) >    IMPRESSION:  Stercoralproctitis.      
HPI: 89F with PMH of DM, HTN, HLD, hypothyroidism presents with generalized weakness over the past week. Son at bedside reports pt was evaluated by PCP 6 days ago and started on amoxicillin for presumed UTI. Previously experienced diarrhea earlier in the week, which has since resolved. Over the past 24 hours, pt has had difficulty standing and using her walker, prompting ED evaluation. No reported fever, nausea, or vomiting at home. Denies CP, SOB, cough, or abdominal pain. Reports rectal pain, suspected to be secondary to hemorrhoids. (17 May 2025 20:42)    Subjective: pt seen this AM, feels better today, no overnight events reported     REVIEW OF SYSTEMS:    CONSTITUTIONAL: No weakness, fevers or chills  EYES/ENT: No visual changes;  No vertigo or throat pain   NECK: No pain or stiffness  RESPIRATORY: No cough, wheezing, hemoptysis; No shortness of breath  CARDIOVASCULAR: No chest pain or palpitations  GASTROINTESTINAL: No abdominal or epigastric pain. No nausea, vomiting, or hematemesis; No diarrhea or constipation. No melena or hematochezia.  GENITOURINARY: No dysuria, frequency or hematuria  NEUROLOGICAL: No numbness or weakness  SKIN: No itching, rashes      MEDICATIONS  (STANDING):  amLODIPine   Tablet 10 milliGRAM(s) Oral daily  cefTRIAXone Injectable. 2000 milliGRAM(s) IV Push every 24 hours  dextrose 5%. 1000 milliLiter(s) (50 mL/Hr) IV Continuous <Continuous>  dextrose 5%. 1000 milliLiter(s) (100 mL/Hr) IV Continuous <Continuous>  dextrose 50% Injectable 25 Gram(s) IV Push once  dextrose 50% Injectable 12.5 Gram(s) IV Push once  dextrose 50% Injectable 25 Gram(s) IV Push once  glucagon  Injectable 1 milliGRAM(s) IntraMuscular once  heparin   Injectable 5000 Unit(s) SubCutaneous every 12 hours  insulin lispro (ADMELOG) corrective regimen sliding scale   SubCutaneous three times a day before meals  insulin lispro (ADMELOG) corrective regimen sliding scale   SubCutaneous at bedtime  levothyroxine 25 MICROGram(s) Oral daily  metoprolol succinate ER 50 milliGRAM(s) Oral daily  potassium chloride   Powder 40 milliEquivalent(s) Oral every 4 hours  sodium chloride 0.9%. 1000 milliLiter(s) (75 mL/Hr) IV Continuous <Continuous>  vancomycin  IVPB 750 milliGRAM(s) IV Intermittent every 24 hours    MEDICATIONS  (PRN):  acetaminophen     Tablet .. 650 milliGRAM(s) Oral once PRN Mild Pain (1 - 3)  aluminum hydroxide/magnesium hydroxide/simethicone Suspension 30 milliLiter(s) Oral every 4 hours PRN Dyspepsia  dextrose Oral Gel 15 Gram(s) Oral once PRN Blood Glucose LESS THAN 70 milliGRAM(s)/deciliter  melatonin 3 milliGRAM(s) Oral at bedtime PRN Insomnia  ondansetron Injectable 4 milliGRAM(s) IV Push every 8 hours PRN Nausea and/or Vomiting      Vital Signs Last 24 Hrs  T(C): 36.6 (18 May 2025 10:44), Max: 36.8 (17 May 2025 19:24)  T(F): 97.9 (18 May 2025 10:44), Max: 98.2 (17 May 2025 19:24)  HR: 88 (18 May 2025 10:44) (67 - 102)  BP: 126/54 (18 May 2025 10:44) (120/54 - 145/70)  BP(mean): 76 (18 May 2025 10:44) (67 - 88)  RR: 16 (18 May 2025 10:44) (13 - 20)  SpO2: 94% (18 May 2025 10:44) (92% - 96%)    Parameters below as of 18 May 2025 10:44  Patient On (Oxygen Delivery Method): room air      PHYSICAL EXAM:  GENERAL: NAD, lying in bed comfortably  HEAD:  Atraumatic, Normocephalic  EYES: conjunctiva and sclera clear  ENT: Moist mucous membranes  NECK: Supple, No JVD  CHEST/LUNG: Clear to auscultation bilaterally; No rales, rhonchi, wheezing. Unlabored respirations  HEART: Regular rate and rhythm; No murmurs  ABDOMEN: Bowel sounds present; Soft, Nontender, Nondistended.   EXTREMITIES:  2+ Peripheral Pulses, brisk capillary refill. No clubbing, cyanosis, or edema  NERVOUS SYSTEM:  Alert & Oriented X3, speech clear. No deficits   MSK: FROM all 4 extremities, full and equal strength  SKIN: sacral wound, closed, with central area of erythema, no oozing     LABS:                          10.0   10.76 )-----------( 265      ( 18 May 2025 06:57 )             30.9     18 May 2025 06:57    139    |  107    |  10     ----------------------------<  104    3.1     |  26     |  0.36     Ca    8.8        18 May 2025 06:57  Phos  2.5       18 May 2025 06:57  Mg     2.2       18 May 2025 06:57    TPro  6.6    /  Alb  2.8    /  TBili  0.3    /  DBili  x      /  AST  21     /  ALT  14     /  AlkPhos  51     18 May 2025 06:57    LIVER FUNCTIONS - ( 18 May 2025 06:57 )  Alb: 2.8 g/dL / Pro: 6.6 gm/dL / ALK PHOS: 51 U/L / ALT: 14 U/L / AST: 21 U/L / GGT: x             CAPILLARY BLOOD GLUCOSE      POCT Blood Glucose.: 98 mg/dL (18 May 2025 08:02)  POCT Blood Glucose.: 201 mg/dL (17 May 2025 23:09)  POCT Blood Glucose.: 233 mg/dL (17 May 2025 11:37)              RADIOLOGY:

## 2025-05-20 NOTE — PROGRESS NOTE ADULT - NUTRITIONAL ASSESSMENT
This patient has been assessed with a concern for Malnutrition and has been determined to have a diagnosis/diagnoses of Severe protein-calorie malnutrition and Underweight (BMI < 19).    This patient is being managed with:   Diet Consistent Carbohydrate/No Snacks-  Entered: May 19 2025  2:37PM  
This patient has been assessed with a concern for Malnutrition and has been determined to have a diagnosis/diagnoses of Severe protein-calorie malnutrition and Underweight (BMI < 19).    This patient is being managed with:   Diet Regular-  Entered: May 17 2025  4:44PM  
This patient has been assessed with a concern for Malnutrition and has been determined to have a diagnosis/diagnoses of Severe protein-calorie malnutrition and Underweight (BMI < 19).    This patient is being managed with:   Diet Consistent Carbohydrate/No Snacks-  Entered: May 19 2025  2:37PM

## 2025-05-20 NOTE — PROGRESS NOTE ADULT - TIME BILLING
I spent a total of 55 minutes on the date of this encounter coordinating the patient's care. This includes reviewing prior documentation, results and imaging in addition to completing a full history and physical examination on the patient. Further tests, medications, and procedures have been ordered as indicated. Laboratory results and the plan of care were communicated to the patient and/or their family member. Supporting documentation was completed and added to the patient's chart.

## 2025-05-20 NOTE — PROGRESS NOTE ADULT - ASSESSMENT
89F admitted for excessive fatigue possible 2/2 recent UTI, vs sacral infected ulcer.     Malaise and fatigue.   Fatigue possible 2/2 recent UTI, vs sacral infected ulcer  Outpt Urine C+S this past week: + Enterococcus faciae 40k per HPF, + pansensitive.  Cover with vancomycin and ceftriaxone  Obtain CT abd/pelvis for further eval -- performed -- f/u results   Air fluidized mattress, offload heels, turn q2h, wound care consult.   ID consult appreciated     DM2 with hyperglycemia  -ISS    Hypothyroidism.   C/w levothyroxine    HTN (hypertension).   C/w her home meds    Prolonged QT interval.  Give Mag 2g IVPB  Repeat ECG     Severe protein-calorie malnutrition  -Nutrition consult appreciated  -Add supplements    DVT ppx: hepsubq  Fall and aspiration precautions.  Turn and reposition q2h to prevent bedsores      
Assessment:  89M with DM, HTN, HLD, hypothyroidism presents 5/16 with generalized weakness and rectal pain  Patient denies any fever or chills, abdominal pain, cough, dysuria  Per chart, recently completed amoxicillin for UTI  Afebrile on admission, on RA  WBC 13  Cr 0.59  ESR 53, CRP 28  UA negative  RVP negative  CXR clear  CTAP with stercoral colitis  BCx 5/17 negative    Antimicrobials:  cefTRIAXone Injectable. 1000 every 24 hours  (5/17 --- )  vancomycin  IVPB 750 every 24 hours (5/17 --- ) Doxy (5/19 --- )    Impression:   #Sacral Wound, Soft Skin Tissue Infection  #Stercoral Colitis  #Leukocytosis resolved  #Generalized Weakness    Recommendations:  - switch empiric Vanco to Doxy 100mg q12 (Day #3)  - continue empiric CTX 2G q24 (Day #3)  - monitor temperature curve  - trend WBC  - reason for abx use reviewed with patient  - side effects of antibiotic discussed, tolerating abx well so far  - Prior cultures reviewed. An epidemiologic assessment was performed. There is a significant risk for resistant microorganisms to spread to family members, and/or healthcare staff. Isolation precautions based on infection control policy. Will reconsider further isolation measures based on new culture results and other clinical data as appropriate Appropriate cultures collected and an appropriate broad spectrum antibiotic therapy will be considered  - Wound care, offloading   - complete 10-day course of empiric antibiotic; oral option Doxy 100mg q12 and Cefuroxime 500mg q12  - rest per primary team    Clinical team may change from intravenous to oral antibiotics when the following criteria are met:   1. Patient is clinically improving/stable       a)	Improved signs and symptoms of infection from initial presentation       b)	Afebrile for 24 hours       c)	Leukocytosis trending towards normal range   2. Patient is tolerating oral intake   3. Initial/repeat blood cultures are negative OR do not need to wait for preliminary blood cultures to result    When above criteria met may change iv antibiotics to an oral agent as above  
89F admitted for excessive fatigue possible 2/2 recent UTI, vs sacral infected ulcer.     Malaise and fatigue  Stercoralproctitis   Outpt Urine C+S this past week: + Enterococcus faecalis  40k per HPF, + pansensitive.  d/c vancomycin, ceftriaxone, doxy  CT abd/pelvis noted, distended rectum with stool  No infection to sacral wound, it is closed, no skin opening   Air fluidized mattress, offload heels, turn q2h, wound care consult.   ID consult appreciated   zosyn until pt has BM    DM2 with hyperglycemia  -ISS    Hypothyroidism.   C/w levothyroxine    HTN (hypertension).   C/w her home meds    Prolonged QT interval.  Give Mag 2g IVPB  Repeat ECG     Severe protein-calorie malnutrition  -Nutrition consult appreciated  -Add supplements    DVT ppx: hepsubq  Fall and aspiration precautions.  Turn and reposition q2h to prevent bedsores      
89F admitted for excessive fatigue possible 2/2 recent UTI, vs sacral infected ulcer.     Malaise and fatigue  Stercoralproctitis   Outpt Urine C+S this past week: + Enterococcus faecalis  40k per HPF, + pansensitive.  d/c vancomycin, ceftriaxone, doxy  CT abd/pelvis noted, distended rectum with stool  No infection to sacral wound, it is closed, no skin opening   Air fluidized mattress, offload heels, turn q2h, wound care consult.   ID consult appreciated   zosyn until pt has BM  bowel regimen, small BM overnight     DM2 with hyperglycemia  -ISS  -start Lantus 5u qhs, pre-meal 2  -monitor closely   -A1C 8.5    Hypothyroidism.   C/w levothyroxine    HTN (hypertension).   C/w her home meds    Prolonged QT interval.  Give Mag 2g IVPB  Repeat ECG     Severe protein-calorie malnutrition  -Nutrition consult appreciated  -Add supplements    DVT ppx: hepsubq  Fall and aspiration precautions.  Turn and reposition q2h to prevent bedsores    DISPO: d/c planning to [facility], once pt no longer constipated

## 2025-05-21 VITALS — DIASTOLIC BLOOD PRESSURE: 66 MMHG | HEART RATE: 80 BPM | SYSTOLIC BLOOD PRESSURE: 128 MMHG

## 2025-05-21 LAB
ANION GAP SERPL CALC-SCNC: 3 MMOL/L — LOW (ref 5–17)
BASOPHILS # BLD AUTO: 0.07 K/UL — SIGNIFICANT CHANGE UP (ref 0–0.2)
BASOPHILS NFR BLD AUTO: 0.9 % — SIGNIFICANT CHANGE UP (ref 0–2)
BUN SERPL-MCNC: 17 MG/DL — SIGNIFICANT CHANGE UP (ref 7–23)
CALCIUM SERPL-MCNC: 9.4 MG/DL — SIGNIFICANT CHANGE UP (ref 8.5–10.1)
CHLORIDE SERPL-SCNC: 108 MMOL/L — SIGNIFICANT CHANGE UP (ref 96–108)
CO2 SERPL-SCNC: 28 MMOL/L — SIGNIFICANT CHANGE UP (ref 22–31)
CREAT SERPL-MCNC: 0.56 MG/DL — SIGNIFICANT CHANGE UP (ref 0.5–1.3)
EGFR: 82 ML/MIN/1.73M2 — SIGNIFICANT CHANGE UP
EGFR: 82 ML/MIN/1.73M2 — SIGNIFICANT CHANGE UP
EOSINOPHIL # BLD AUTO: 0.28 K/UL — SIGNIFICANT CHANGE UP (ref 0–0.5)
EOSINOPHIL NFR BLD AUTO: 3.6 % — SIGNIFICANT CHANGE UP (ref 0–6)
GLUCOSE BLDC GLUCOMTR-MCNC: 116 MG/DL — HIGH (ref 70–99)
GLUCOSE SERPL-MCNC: 111 MG/DL — HIGH (ref 70–99)
HCT VFR BLD CALC: 32.3 % — LOW (ref 34.5–45)
HGB BLD-MCNC: 10.4 G/DL — LOW (ref 11.5–15.5)
IMM GRANULOCYTES # BLD AUTO: 0.02 K/UL — SIGNIFICANT CHANGE UP (ref 0–0.07)
IMM GRANULOCYTES NFR BLD AUTO: 0.3 % — SIGNIFICANT CHANGE UP (ref 0–0.9)
LYMPHOCYTES # BLD AUTO: 2.46 K/UL — SIGNIFICANT CHANGE UP (ref 1–3.3)
LYMPHOCYTES NFR BLD AUTO: 31.6 % — SIGNIFICANT CHANGE UP (ref 13–44)
MCHC RBC-ENTMCNC: 30 PG — SIGNIFICANT CHANGE UP (ref 27–34)
MCHC RBC-ENTMCNC: 32.2 G/DL — SIGNIFICANT CHANGE UP (ref 32–36)
MCV RBC AUTO: 93.1 FL — SIGNIFICANT CHANGE UP (ref 80–100)
MONOCYTES # BLD AUTO: 0.7 K/UL — SIGNIFICANT CHANGE UP (ref 0–0.9)
MONOCYTES NFR BLD AUTO: 9 % — SIGNIFICANT CHANGE UP (ref 2–14)
NEUTROPHILS # BLD AUTO: 4.26 K/UL — SIGNIFICANT CHANGE UP (ref 1.8–7.4)
NEUTROPHILS NFR BLD AUTO: 54.6 % — SIGNIFICANT CHANGE UP (ref 43–77)
NRBC # BLD AUTO: 0 K/UL — SIGNIFICANT CHANGE UP (ref 0–0)
NRBC # FLD: 0 K/UL — SIGNIFICANT CHANGE UP (ref 0–0)
NRBC BLD AUTO-RTO: 0 /100 WBCS — SIGNIFICANT CHANGE UP (ref 0–0)
PLATELET # BLD AUTO: 306 K/UL — SIGNIFICANT CHANGE UP (ref 150–400)
PMV BLD: 9.9 FL — SIGNIFICANT CHANGE UP (ref 7–13)
POTASSIUM SERPL-MCNC: 3.8 MMOL/L — SIGNIFICANT CHANGE UP (ref 3.5–5.3)
POTASSIUM SERPL-SCNC: 3.8 MMOL/L — SIGNIFICANT CHANGE UP (ref 3.5–5.3)
RBC # BLD: 3.47 M/UL — LOW (ref 3.8–5.2)
RBC # FLD: 13.1 % — SIGNIFICANT CHANGE UP (ref 10.3–14.5)
SODIUM SERPL-SCNC: 139 MMOL/L — SIGNIFICANT CHANGE UP (ref 135–145)
WBC # BLD: 7.79 K/UL — SIGNIFICANT CHANGE UP (ref 3.8–10.5)
WBC # FLD AUTO: 7.79 K/UL — SIGNIFICANT CHANGE UP (ref 3.8–10.5)

## 2025-05-21 PROCEDURE — 99239 HOSP IP/OBS DSCHRG MGMT >30: CPT

## 2025-05-21 RX ORDER — DOXYCYCLINE HYCLATE 100 MG
1 TABLET ORAL
Qty: 20 | Refills: 0
Start: 2025-05-21 | End: 2025-05-30

## 2025-05-21 RX ORDER — CEFUROXIME SODIUM 1.5 G
1 VIAL (EA) INJECTION
Qty: 20 | Refills: 0
Start: 2025-05-21 | End: 2025-05-30

## 2025-05-21 RX ADMIN — POLYETHYLENE GLYCOL 3350 17 GRAM(S): 17 POWDER, FOR SOLUTION ORAL at 10:19

## 2025-05-21 RX ADMIN — HEPARIN SODIUM 5000 UNIT(S): 1000 INJECTION INTRAVENOUS; SUBCUTANEOUS at 10:20

## 2025-05-21 RX ADMIN — Medication 25 MICROGRAM(S): at 06:00

## 2025-05-21 RX ADMIN — INSULIN LISPRO 2 UNIT(S): 100 INJECTION, SOLUTION INTRAVENOUS; SUBCUTANEOUS at 08:06

## 2025-05-21 RX ADMIN — METOPROLOL SUCCINATE 50 MILLIGRAM(S): 50 TABLET, EXTENDED RELEASE ORAL at 10:19

## 2025-05-21 RX ADMIN — AMLODIPINE BESYLATE 10 MILLIGRAM(S): 10 TABLET ORAL at 10:19

## 2025-05-21 RX ADMIN — INSULIN LISPRO 2 UNIT(S): 100 INJECTION, SOLUTION INTRAVENOUS; SUBCUTANEOUS at 11:55

## 2025-05-21 RX ADMIN — Medication 25 GRAM(S): at 06:01

## 2025-05-21 NOTE — DISCHARGE NOTE PROVIDER - NSDCCPCAREPLAN_GEN_ALL_CORE_FT
PRINCIPAL DISCHARGE DIAGNOSIS  Diagnosis: Infected decubitus ulcer  Assessment and Plan of Treatment: complete antibiotics as prescribed. 10 days

## 2025-05-21 NOTE — DISCHARGE NOTE PROVIDER - HOSPITAL COURSE
"89F with PMH of DM, HTN, HLD, hypothyroidism presents with generalized weakness over the past week. Son at bedside reports pt was evaluated by PCP 6 days ago and started on amoxicillin for presumed UTI. Previously experienced diarrhea earlier in the week, which has since resolved. Over the past 24 hours, pt has had difficulty standing and using her walker, prompting ED evaluation. No reported fever, nausea, or vomiting at home. Denies CP, SOB, cough, or abdominal pain. Reports rectal pain, suspected to be secondary to hemorrhoids."    Hospital Course: "89F with PMH of DM, HTN, HLD, hypothyroidism presents with generalized weakness over the past week. Son at bedside reports pt was evaluated by PCP 6 days ago and started on amoxicillin for presumed UTI. Previously experienced diarrhea earlier in the week, which has since resolved. Over the past 24 hours, pt has had difficulty standing and using her walker, prompting ED evaluation. No reported fever, nausea, or vomiting at home. Denies CP, SOB, cough, or abdominal pain. Reports rectal pain, suspected to be secondary to hemorrhoids."    Hospital Course: pt treated for UTI as an outpatient with 1 dose of amoxicillin. ua here clean. concern was raised for soft tissue infection of the sacrum - started on ceftriaxone and vanco which was switched to ceftriaxone and doxy. pt also found to have stercoral colitis and placed on zosyn. Pt have multiple bowel movements and feels well now. denies dysuria. Discussed case with Id. recommend continuation of ceftin and doxy to complete total 10 day course.      #Malaise and fatigue  #Stercoralproctitis   #recent UTI   #sepsis POA  leukocytosis tachycardia source   lactate wnl   received sepsis bolus  had multiple bowel movements overnight  denies abd pain, n/v   tolerating diet  Discussed with ID; since UA is clean, would complete treatment for soft tissue infection of sacrum with total 10 days ceftin and doxy     DM2 with hyperglycemia  -ISS  -start Lantus 5u qhs, pre-meal 2  -monitor closely   -A1C 8.5    Hypothyroidism.   C/w levothyroxine    HTN (hypertension).   C/w her home meds    Prolonged QT interval.  Give Mag 2g IVPB  Repeat --> 491    Severe protein-calorie malnutrition  -Nutrition consult appreciated  -Add supplements    dispo [facility]

## 2025-05-21 NOTE — DISCHARGE NOTE PROVIDER - NSDCPNSUBOBJ_GEN_ALL_CORE
HOSPITALIST ATTENDING PROGRESS NOTE    Chart and meds reviewed.  Patient seen and examined.    CC: fatigue    Subjective: pt feels well. bright this morning. awake alert. had multiple bms overnight. no abd pain, n.v    All other systems reviewed and found to be negative with the exception of what has been described above.    MEDICATIONS  (STANDING):  amLODIPine   Tablet 10 milliGRAM(s) Oral daily  dextrose 5%. 1000 milliLiter(s) (50 mL/Hr) IV Continuous <Continuous>  dextrose 5%. 1000 milliLiter(s) (100 mL/Hr) IV Continuous <Continuous>  dextrose 50% Injectable 25 Gram(s) IV Push once  dextrose 50% Injectable 12.5 Gram(s) IV Push once  dextrose 50% Injectable 25 Gram(s) IV Push once  glucagon  Injectable 1 milliGRAM(s) IntraMuscular once  heparin   Injectable 5000 Unit(s) SubCutaneous every 12 hours  insulin glargine Injectable (LANTUS) 5 Unit(s) SubCutaneous at bedtime  insulin lispro (ADMELOG) corrective regimen sliding scale   SubCutaneous three times a day before meals  insulin lispro (ADMELOG) corrective regimen sliding scale   SubCutaneous at bedtime  insulin lispro Injectable (ADMELOG) 2 Unit(s) SubCutaneous three times a day before meals  levothyroxine 25 MICROGram(s) Oral daily  metoprolol succinate ER 50 milliGRAM(s) Oral daily  piperacillin/tazobactam IVPB.. 3.375 Gram(s) IV Intermittent every 8 hours    MEDICATIONS  (PRN):  aluminum hydroxide/magnesium hydroxide/simethicone Suspension 30 milliLiter(s) Oral every 4 hours PRN Dyspepsia  dextrose Oral Gel 15 Gram(s) Oral once PRN Blood Glucose LESS THAN 70 milliGRAM(s)/deciliter  melatonin 3 milliGRAM(s) Oral at bedtime PRN Insomnia  ondansetron Injectable 4 milliGRAM(s) IV Push every 8 hours PRN Nausea and/or Vomiting  traMADol 25 milliGRAM(s) Oral every 8 hours PRN Severe Pain (7 - 10)      PHYSICAL EXAM:  Gen: NAD  CV:  +S1, +S2, regular, no murmurs or rubs  RESP:   lungs clear to auscultation bilaterally, no wheezing, rales, rhonchi, good air entry bilaterally  GI:  abdomen soft, non-tender, non-distended, normal BS, no bruits, no abdominal masses, no palpable masses BS+ no TTP to deep palpation  :  not examined  MSK:   normal muscle tone, no atrophy, no rigidity, no contractions  EXT:  no edema, no calf pain, swelling or erythema  NEURO:  AAOX3, no focal neurological deficits, follows all commands, able to move extremities spontaneously    LABS:                            10.4   7.79  )-----------( 306      ( 21 May 2025 06:25 )             32.3     05-21    139  |  108  |  17  ----------------------------<  111[H]  3.8   |  28  |  0.56    Ca    9.4      21 May 2025 06:25              Urinalysis Basic - ( 21 May 2025 06:25 )    Color: x / Appearance: x / SG: x / pH: x  Gluc: 111 mg/dL / Ketone: x  / Bili: x / Urobili: x   Blood: x / Protein: x / Nitrite: x   Leuk Esterase: x / RBC: x / WBC x   Sq Epi: x / Non Sq Epi: x / Bacteria: x              CULTURES:  Culture Results:   No growth at 72 Hours (05-17-25 @ 13:40)  Culture Results:   No growth at 72 Hours (05-17-25 @ 13:40)

## 2025-05-21 NOTE — DISCHARGE NOTE PROVIDER - DETAILS OF MALNUTRITION DIAGNOSIS/DIAGNOSES
This patient has been assessed with a concern for Malnutrition and was treated during this hospitalization for the following Nutrition diagnosis/diagnoses:     -  05/18/2025: Severe protein-calorie malnutrition   -  05/18/2025: Underweight (BMI < 19)

## 2025-05-21 NOTE — DISCHARGE NOTE PROVIDER - CARE PROVIDER_API CALL
Chet Mota  Family Medicine  [address] [place] [address]  Phone: [phone]  Fax: [phone]  Follow Up Time: 1 week

## 2025-05-21 NOTE — DISCHARGE NOTE NURSING/CASE MANAGEMENT/SOCIAL WORK - FINANCIAL ASSISTANCE
[facility] provides services at a reduced cost to those who are determined to be eligible through [facility]’s financial assistance program. Information regarding [facility]’s financial assistance program can be found by going to [email or web address] or by calling [phone].

## 2025-05-21 NOTE — DISCHARGE NOTE PROVIDER - NSDCMRMEDTOKEN_GEN_ALL_CORE_FT
amLODIPine 10 mg oral tablet: 1 tab(s) orally once a day  glipiZIDE 10 mg oral tablet, extended release: 1 tab(s) orally once a day  levothyroxine 25 mcg (0.025 mg) oral tablet: 1 tab(s) orally once a day  lovastatin 10 mg oral tablet: 1 tab(s) orally once a day  metFORMIN 500 mg oral tablet: 1 tab(s) orally 2 times a day  metoprolol succinate 50 mg oral tablet, extended release: 1 tab(s) orally once a day   amLODIPine 10 mg oral tablet: 1 tab(s) orally once a day  cefuroxime 500 mg oral tablet: 1 tab(s) orally 2 times a day  doxycycline hyclate 100 mg oral capsule: 1 cap(s) orally 2 times a day  glipiZIDE 10 mg oral tablet, extended release: 1 tab(s) orally once a day  levothyroxine 25 mcg (0.025 mg) oral tablet: 1 tab(s) orally once a day  lovastatin 10 mg oral tablet: 1 tab(s) orally once a day  metFORMIN 500 mg oral tablet: 1 tab(s) orally 2 times a day  metoprolol succinate 50 mg oral tablet, extended release: 1 tab(s) orally once a day

## 2025-05-21 NOTE — DISCHARGE NOTE NURSING/CASE MANAGEMENT/SOCIAL WORK - PATIENT PORTAL LINK FT
You can access the FollowMyHealth Patient Portal offered by [facility] by registering at the following website: [email or web address]. By joining [employer]’s FollowMyHealth portal, you will also be able to view your health information using other applications (apps) compatible with our system.

## 2025-06-01 ENCOUNTER — EMERGENCY (EMERGENCY)
Facility: HOSPITAL | Age: 89
LOS: 0 days | Discharge: HOME | End: 2025-06-01
Attending: STUDENT IN AN ORGANIZED HEALTH CARE EDUCATION/TRAINING PROGRAM
Payer: MEDICARE

## 2025-06-01 VITALS
RESPIRATION RATE: 18 BRPM | HEART RATE: 77 BPM | OXYGEN SATURATION: 95 % | SYSTOLIC BLOOD PRESSURE: 114 MMHG | DIASTOLIC BLOOD PRESSURE: 52 MMHG

## 2025-06-01 VITALS
HEART RATE: 102 BPM | TEMPERATURE: 98 F | WEIGHT: 130.07 LBS | OXYGEN SATURATION: 96 % | RESPIRATION RATE: 18 BRPM | HEIGHT: 59 IN | DIASTOLIC BLOOD PRESSURE: 77 MMHG | SYSTOLIC BLOOD PRESSURE: 165 MMHG

## 2025-06-01 DIAGNOSIS — R00.0 TACHYCARDIA, UNSPECIFIED: ICD-10-CM

## 2025-06-01 DIAGNOSIS — Z86.39 PERSONAL HISTORY OF OTHER ENDOCRINE, NUTRITIONAL AND METABOLIC DISEASE: ICD-10-CM

## 2025-06-01 DIAGNOSIS — S09.90XA UNSPECIFIED INJURY OF HEAD, INITIAL ENCOUNTER: ICD-10-CM

## 2025-06-01 DIAGNOSIS — E11.9 TYPE 2 DIABETES MELLITUS WITHOUT COMPLICATIONS: ICD-10-CM

## 2025-06-01 DIAGNOSIS — M54.6 PAIN IN THORACIC SPINE: ICD-10-CM

## 2025-06-01 DIAGNOSIS — E78.5 HYPERLIPIDEMIA, UNSPECIFIED: ICD-10-CM

## 2025-06-01 DIAGNOSIS — I10 ESSENTIAL (PRIMARY) HYPERTENSION: ICD-10-CM
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
## 2025-06-01 LAB
ALBUMIN SERPL ELPH-MCNC: 3.1 G/DL — LOW (ref 3.3–5)
ALP SERPL-CCNC: 63 U/L — SIGNIFICANT CHANGE UP (ref 40–120)
ALT FLD-CCNC: 16 U/L — SIGNIFICANT CHANGE UP (ref 12–78)
ANION GAP SERPL CALC-SCNC: 5 MMOL/L — SIGNIFICANT CHANGE UP (ref 5–17)
APPEARANCE UR: CLEAR — SIGNIFICANT CHANGE UP
APTT BLD: 30.1 SEC — SIGNIFICANT CHANGE UP (ref 26.1–36.8)
AST SERPL-CCNC: 19 U/L — SIGNIFICANT CHANGE UP (ref 15–37)
BASOPHILS # BLD AUTO: 0.08 K/UL — SIGNIFICANT CHANGE UP (ref 0–0.2)
BASOPHILS NFR BLD AUTO: 0.7 % — SIGNIFICANT CHANGE UP (ref 0–2)
BILIRUB SERPL-MCNC: 0.3 MG/DL — SIGNIFICANT CHANGE UP (ref 0.2–1.2)
BILIRUB UR-MCNC: NEGATIVE — SIGNIFICANT CHANGE UP
BUN SERPL-MCNC: 28 MG/DL — HIGH (ref 7–23)
CALCIUM SERPL-MCNC: 9.8 MG/DL — SIGNIFICANT CHANGE UP (ref 8.5–10.1)
CHLORIDE SERPL-SCNC: 102 MMOL/L — SIGNIFICANT CHANGE UP (ref 96–108)
CO2 SERPL-SCNC: 29 MMOL/L — SIGNIFICANT CHANGE UP (ref 22–31)
COLOR SPEC: YELLOW — SIGNIFICANT CHANGE UP
CREAT SERPL-MCNC: 0.48 MG/DL — LOW (ref 0.5–1.3)
DIFF PNL FLD: NEGATIVE — SIGNIFICANT CHANGE UP
EGFR: 86 ML/MIN/1.73M2 — SIGNIFICANT CHANGE UP
EGFR: 86 ML/MIN/1.73M2 — SIGNIFICANT CHANGE UP
EOSINOPHIL # BLD AUTO: 0.35 K/UL — SIGNIFICANT CHANGE UP (ref 0–0.5)
EOSINOPHIL NFR BLD AUTO: 3.2 % — SIGNIFICANT CHANGE UP (ref 0–6)
GLUCOSE SERPL-MCNC: 167 MG/DL — HIGH (ref 70–99)
GLUCOSE UR QL: NEGATIVE MG/DL — SIGNIFICANT CHANGE UP
HCT VFR BLD CALC: 31.7 % — LOW (ref 34.5–45)
HGB BLD-MCNC: 10.4 G/DL — LOW (ref 11.5–15.5)
IMM GRANULOCYTES # BLD AUTO: 0.05 K/UL — SIGNIFICANT CHANGE UP (ref 0–0.07)
IMM GRANULOCYTES NFR BLD AUTO: 0.5 % — SIGNIFICANT CHANGE UP (ref 0–0.9)
INR BLD: 0.97 RATIO — SIGNIFICANT CHANGE UP (ref 0.85–1.16)
KETONES UR QL: ABNORMAL MG/DL
LEUKOCYTE ESTERASE UR-ACNC: NEGATIVE — SIGNIFICANT CHANGE UP
LYMPHOCYTES # BLD AUTO: 1.74 K/UL — SIGNIFICANT CHANGE UP (ref 1–3.3)
LYMPHOCYTES NFR BLD AUTO: 15.8 % — SIGNIFICANT CHANGE UP (ref 13–44)
MCHC RBC-ENTMCNC: 30.1 PG — SIGNIFICANT CHANGE UP (ref 27–34)
MCHC RBC-ENTMCNC: 32.8 G/DL — SIGNIFICANT CHANGE UP (ref 32–36)
MCV RBC AUTO: 91.6 FL — SIGNIFICANT CHANGE UP (ref 80–100)
MONOCYTES # BLD AUTO: 0.7 K/UL — SIGNIFICANT CHANGE UP (ref 0–0.9)
MONOCYTES NFR BLD AUTO: 6.4 % — SIGNIFICANT CHANGE UP (ref 2–14)
NEUTROPHILS # BLD AUTO: 8.09 K/UL — HIGH (ref 1.8–7.4)
NEUTROPHILS NFR BLD AUTO: 73.4 % — SIGNIFICANT CHANGE UP (ref 43–77)
NITRITE UR-MCNC: NEGATIVE — SIGNIFICANT CHANGE UP
NRBC # BLD AUTO: 0 K/UL — SIGNIFICANT CHANGE UP (ref 0–0)
NRBC # FLD: 0 K/UL — SIGNIFICANT CHANGE UP (ref 0–0)
NRBC BLD AUTO-RTO: 0 /100 WBCS — SIGNIFICANT CHANGE UP (ref 0–0)
PH UR: 5.5 — SIGNIFICANT CHANGE UP (ref 5–8)
PLATELET # BLD AUTO: 364 K/UL — SIGNIFICANT CHANGE UP (ref 150–400)
PMV BLD: 9.4 FL — SIGNIFICANT CHANGE UP (ref 7–13)
POTASSIUM SERPL-MCNC: 3.8 MMOL/L — SIGNIFICANT CHANGE UP (ref 3.5–5.3)
POTASSIUM SERPL-SCNC: 3.8 MMOL/L — SIGNIFICANT CHANGE UP (ref 3.5–5.3)
PROT SERPL-MCNC: 6.6 GM/DL — SIGNIFICANT CHANGE UP (ref 6–8.3)
PROT UR-MCNC: NEGATIVE MG/DL — SIGNIFICANT CHANGE UP
PROTHROM AB SERPL-ACNC: 11.5 SEC — SIGNIFICANT CHANGE UP (ref 9.9–13.4)
RBC # BLD: 3.46 M/UL — LOW (ref 3.8–5.2)
RBC # FLD: 13.2 % — SIGNIFICANT CHANGE UP (ref 10.3–14.5)
SODIUM SERPL-SCNC: 136 MMOL/L — SIGNIFICANT CHANGE UP (ref 135–145)
SP GR SPEC: >1.03 — HIGH (ref 1–1.03)
UROBILINOGEN FLD QL: 0.2 MG/DL — SIGNIFICANT CHANGE UP (ref 0.2–1)
WBC # BLD: 11.01 K/UL — HIGH (ref 3.8–10.5)
WBC # FLD AUTO: 11.01 K/UL — HIGH (ref 3.8–10.5)

## 2025-06-01 PROCEDURE — 93010 ELECTROCARDIOGRAM REPORT: CPT

## 2025-06-01 PROCEDURE — 80053 COMPREHEN METABOLIC PANEL: CPT

## 2025-06-01 PROCEDURE — 70450 CT HEAD/BRAIN W/O DYE: CPT | Mod: 26

## 2025-06-01 PROCEDURE — 71260 CT THORAX DX C+: CPT

## 2025-06-01 PROCEDURE — 74177 CT ABD & PELVIS W/CONTRAST: CPT | Mod: 26

## 2025-06-01 PROCEDURE — 96374 THER/PROPH/DIAG INJ IV PUSH: CPT | Mod: XU

## 2025-06-01 PROCEDURE — 36415 COLL VENOUS BLD VENIPUNCTURE: CPT

## 2025-06-01 PROCEDURE — 85610 PROTHROMBIN TIME: CPT

## 2025-06-01 PROCEDURE — 74177 CT ABD & PELVIS W/CONTRAST: CPT

## 2025-06-01 PROCEDURE — 85025 COMPLETE CBC W/AUTO DIFF WBC: CPT

## 2025-06-01 PROCEDURE — 93005 ELECTROCARDIOGRAM TRACING: CPT

## 2025-06-01 PROCEDURE — 72125 CT NECK SPINE W/O DYE: CPT

## 2025-06-01 PROCEDURE — 99285 EMERGENCY DEPT VISIT HI MDM: CPT | Mod: 25

## 2025-06-01 PROCEDURE — 72125 CT NECK SPINE W/O DYE: CPT | Mod: 26

## 2025-06-01 PROCEDURE — 71260 CT THORAX DX C+: CPT | Mod: 26

## 2025-06-01 PROCEDURE — 85730 THROMBOPLASTIN TIME PARTIAL: CPT

## 2025-06-01 PROCEDURE — 99285 EMERGENCY DEPT VISIT HI MDM: CPT

## 2025-06-01 PROCEDURE — 81003 URINALYSIS AUTO W/O SCOPE: CPT

## 2025-06-01 PROCEDURE — 70450 CT HEAD/BRAIN W/O DYE: CPT

## 2025-06-01 RX ORDER — ACETAMINOPHEN 500 MG/5ML
1000 LIQUID (ML) ORAL ONCE
Refills: 0 | Status: COMPLETED | OUTPATIENT
Start: 2025-06-01 | End: 2025-06-01

## 2025-06-01 RX ADMIN — Medication 400 MILLIGRAM(S): at 13:19

## 2025-06-01 NOTE — ED PROVIDER NOTE - PATIENT PORTAL LINK FT
You can access the FollowMyHealth Patient Portal offered by [facility] by registering at the following website: [email or web address]. By joining [employer]’s FollowMyHealth portal, you will also be able to view your health information using other applications (apps) compatible with our system.

## 2025-06-01 NOTE — ED ADULT NURSE NOTE - NSHOSCREENINGQ1_ED_ALL_ED
Attempted CT scan for portal vein thrombosis. Moved patient onto CT table and began moving table into CT scanner to begin. Pt became combative, pulling arms from safety straps, and attempting to pull himself out of the CT scanner. Attempts were made to re-secure the patient on the table but failed. Floor was notified and scan was not completed. No

## 2025-06-01 NOTE — ED ADULT NURSE NOTE - OBJECTIVE STATEMENT
Pt BIBEMS s/p mechanical trip and fall from standing height c/o head, neck and back pain. (+) head strike (-) LOC (-) ac. C-collar in place by EMS. as per son at bedside pt uses a wheelchair

## 2025-06-01 NOTE — ED PROVIDER NOTE - OBJECTIVE STATEMENT
89 year old female with PMHx of HTN HLD, DM and thyroid disease brought in by EMS to the ED c/o closed head injury and mid/upper back pain s/p mechanical fall. Pt was using walker but fell backwards striking head and landing on her back. Pt denies dizziness, lightheadedness, or LOC. Pt has c-collar in place.

## 2025-06-01 NOTE — ED ADULT NURSE NOTE - CHIEF COMPLAINT QUOTE
Pt BIBEMS s/p mechanical trip and fall from standing height c/o head, neck and back pain. (+) head strike (-) LOC (-) ac. C-collar in place by EMS. As per MD Robbie JI, but pt requires IV placement prior to CT scan. Primary RN notified.

## 2025-06-01 NOTE — ED PROVIDER NOTE - DR. NAME
Robbie I have seen and evaluated this patient with the resident.   I agree with the findings  unless other wise stated.  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  insists to have it done outpatient --Mccray

## 2025-06-01 NOTE — ED ADULT TRIAGE NOTE - CHIEF COMPLAINT QUOTE
fall Pt BIBEMS s/p mechanical trip and fall from standing height c/o head, neck and back pain. (+) head strike (-) LOC (-) ac. C-collar in place by EMS. As per MD Robbie JI, but pt requires IV placement prior to CT scan. Primary RN notified.

## 2025-06-01 NOTE — ED ADULT NURSE NOTE - NSFALLHARMRISKINTERV_ED_ALL_ED

## 2025-06-01 NOTE — ED PROVIDER NOTE - CLINICAL SUMMARY MEDICAL DECISION MAKING FREE TEXT BOX
89 year old female brought in by EMS to the ED s/p mechanical fall. Pt with closed head injury, and back pain. Will plan for CT imaging, pain control and reevaluate. 89 year old female brought in by EMS to the ED s/p mechanical fall. Pt with closed head injury, and back pain. Will plan for CT imaging, pain control and reevaluate.    Robbie DO: CT without traumatic injury; ambulation trial performed with walker with steady gait; family comfortable with plan of care for dc back to facility at this time; strict return precautions given.

## 2025-06-11 NOTE — CHART NOTE - NSCHARTNOTEFT_GEN_A_CORE
response to query   agree with nutrition eval: moderate malnutrition
response to query   sepsis criteria met on admission with leukocytosis to 13k and tachycardia to 102  source uti vs sacral soft tissue infection vs both  leukocytosis resolved with iv abx